# Patient Record
Sex: FEMALE | Race: WHITE | NOT HISPANIC OR LATINO | Employment: FULL TIME | RURAL
[De-identification: names, ages, dates, MRNs, and addresses within clinical notes are randomized per-mention and may not be internally consistent; named-entity substitution may affect disease eponyms.]

---

## 2021-04-07 ENCOUNTER — HISTORICAL (OUTPATIENT)
Dept: ADMINISTRATIVE | Facility: HOSPITAL | Age: 51
End: 2021-04-07

## 2021-04-07 LAB
25(OH)D3 SERPL-MCNC: 12.1 NG/ML
ALBUMIN SERPL BCP-MCNC: 3.6 G/DL (ref 3.5–5)
ALBUMIN/GLOB SERPL: 1 {RATIO}
ALP SERPL-CCNC: 102 U/L (ref 39–100)
ALT SERPL W P-5'-P-CCNC: 45 U/L (ref 13–56)
ANION GAP SERPL CALCULATED.3IONS-SCNC: 12 MMOL/L (ref 7–16)
AST SERPL W P-5'-P-CCNC: 28 U/L (ref 15–37)
BASOPHILS # BLD AUTO: 0.12 X10E3/UL (ref 0–0.2)
BASOPHILS NFR BLD AUTO: 1 % (ref 0–1)
BILIRUB SERPL-MCNC: 0.4 MG/DL (ref 0–1.2)
BUN SERPL-MCNC: 10 MG/DL (ref 7–18)
BUN/CREAT SERPL: 13
CALCIUM SERPL-MCNC: 8.7 MG/DL (ref 8.5–10.1)
CHLORIDE SERPL-SCNC: 104 MMOL/L (ref 98–107)
CHOLEST SERPL-MCNC: 330 MG/DL
CHOLEST/HDLC SERPL: 10.6 {RATIO}
CO2 SERPL-SCNC: 27 MMOL/L (ref 21–32)
CREAT SERPL-MCNC: 0.79 MG/DL (ref 0.5–1.02)
EOSINOPHIL # BLD AUTO: 0.41 X10E3/UL (ref 0–0.5)
EOSINOPHIL NFR BLD AUTO: 3.5 % (ref 1–4)
ERYTHROCYTE [DISTWIDTH] IN BLOOD BY AUTOMATED COUNT: 15.8 % (ref 11.5–14.5)
GLOBULIN SER-MCNC: 3.7 G/DL (ref 2–4)
GLUCOSE SERPL-MCNC: 100 MG/DL (ref 74–106)
HCT VFR BLD AUTO: 39.8 % (ref 38–47)
HDLC SERPL-MCNC: 31 MG/DL
HGB BLD-MCNC: 12.2 G/DL (ref 12–16)
IMM GRANULOCYTES # BLD AUTO: 0.09 X10E3/UL (ref 0–0.04)
IMM GRANULOCYTES NFR BLD: 0.8 % (ref 0–0.4)
LDLC SERPL CALC-MCNC: NORMAL MG/DL
LYMPHOCYTES # BLD AUTO: 3.41 X10E3/UL (ref 1–4.8)
LYMPHOCYTES NFR BLD AUTO: 29.1 % (ref 27–41)
MCH RBC QN AUTO: 26.3 PG (ref 27–31)
MCHC RBC AUTO-ENTMCNC: 30.7 G/DL (ref 32–36)
MCV RBC AUTO: 85.8 FL (ref 80–96)
MONOCYTES # BLD AUTO: 0.65 X10E3/UL (ref 0–0.8)
MONOCYTES NFR BLD AUTO: 5.5 % (ref 2–6)
MPC BLD CALC-MCNC: 11.1 FL (ref 9.4–12.4)
NEUTROPHILS # BLD AUTO: 7.04 X10E3/UL (ref 1.8–7.7)
NEUTROPHILS NFR BLD AUTO: 60.1 % (ref 53–65)
NRBC # BLD AUTO: 0 X10E3/UL (ref 0–0)
NRBC, AUTO (.00): 0 /100 (ref 0–0)
PLATELET # BLD AUTO: 443 X10E3/UL (ref 150–400)
POTASSIUM SERPL-SCNC: 3.2 MMOL/L (ref 3.5–5.1)
PROT SERPL-MCNC: 7.3 G/DL (ref 6.4–8.2)
RBC # BLD AUTO: 4.64 X10E6/UL (ref 4.2–5.4)
SODIUM SERPL-SCNC: 140 MMOL/L (ref 136–145)
TRIGL SERPL-MCNC: 727 MG/DL
TSH SERPL DL<=0.005 MIU/L-ACNC: 4.56 UIU/ML (ref 0.36–3.74)
WBC # BLD AUTO: 11.72 X10E3/UL (ref 4.5–11)

## 2021-04-08 LAB
EST. AVERAGE GLUCOSE BLD GHB EST-MCNC: 117 MG/DL
HBA1C MFR BLD HPLC: 6.1 %

## 2021-12-30 ENCOUNTER — OFFICE VISIT (OUTPATIENT)
Dept: FAMILY MEDICINE | Facility: CLINIC | Age: 51
End: 2021-12-30
Payer: COMMERCIAL

## 2021-12-30 VITALS
BODY MASS INDEX: 22.96 KG/M2 | HEART RATE: 65 BPM | TEMPERATURE: 97 F | WEIGHT: 137.81 LBS | HEIGHT: 65 IN | DIASTOLIC BLOOD PRESSURE: 78 MMHG | RESPIRATION RATE: 18 BRPM | OXYGEN SATURATION: 97 % | SYSTOLIC BLOOD PRESSURE: 130 MMHG

## 2021-12-30 DIAGNOSIS — R53.83 FATIGUE, UNSPECIFIED TYPE: ICD-10-CM

## 2021-12-30 DIAGNOSIS — L82.1 SEBORRHEIC KERATOSES: ICD-10-CM

## 2021-12-30 DIAGNOSIS — M19.90 OSTEOARTHRITIS, UNSPECIFIED OSTEOARTHRITIS TYPE, UNSPECIFIED SITE: ICD-10-CM

## 2021-12-30 DIAGNOSIS — I10 HYPERTENSION, UNSPECIFIED TYPE: Primary | ICD-10-CM

## 2021-12-30 PROCEDURE — 1160F PR REVIEW ALL MEDS BY PRESCRIBER/CLIN PHARMACIST DOCUMENTED: ICD-10-PCS | Mod: CPTII,,, | Performed by: NURSE PRACTITIONER

## 2021-12-30 PROCEDURE — 3008F PR BODY MASS INDEX (BMI) DOCUMENTED: ICD-10-PCS | Mod: CPTII,,, | Performed by: NURSE PRACTITIONER

## 2021-12-30 PROCEDURE — 1159F MED LIST DOCD IN RCRD: CPT | Mod: CPTII,,, | Performed by: NURSE PRACTITIONER

## 2021-12-30 PROCEDURE — 96372 THER/PROPH/DIAG INJ SC/IM: CPT | Mod: ,,, | Performed by: NURSE PRACTITIONER

## 2021-12-30 PROCEDURE — 3078F DIAST BP <80 MM HG: CPT | Mod: CPTII,,, | Performed by: NURSE PRACTITIONER

## 2021-12-30 PROCEDURE — 99203 OFFICE O/P NEW LOW 30 MIN: CPT | Mod: 25,,, | Performed by: NURSE PRACTITIONER

## 2021-12-30 PROCEDURE — 4010F ACE/ARB THERAPY RXD/TAKEN: CPT | Mod: CPTII,,, | Performed by: NURSE PRACTITIONER

## 2021-12-30 PROCEDURE — 1160F RVW MEDS BY RX/DR IN RCRD: CPT | Mod: CPTII,,, | Performed by: NURSE PRACTITIONER

## 2021-12-30 PROCEDURE — 3044F HG A1C LEVEL LT 7.0%: CPT | Mod: CPTII,,, | Performed by: NURSE PRACTITIONER

## 2021-12-30 PROCEDURE — 99203 PR OFFICE/OUTPT VISIT, NEW, LEVL III, 30-44 MIN: ICD-10-PCS | Mod: 25,,, | Performed by: NURSE PRACTITIONER

## 2021-12-30 PROCEDURE — 3075F SYST BP GE 130 - 139MM HG: CPT | Mod: CPTII,,, | Performed by: NURSE PRACTITIONER

## 2021-12-30 PROCEDURE — 1159F PR MEDICATION LIST DOCUMENTED IN MEDICAL RECORD: ICD-10-PCS | Mod: CPTII,,, | Performed by: NURSE PRACTITIONER

## 2021-12-30 PROCEDURE — 3078F PR MOST RECENT DIASTOLIC BLOOD PRESSURE < 80 MM HG: ICD-10-PCS | Mod: CPTII,,, | Performed by: NURSE PRACTITIONER

## 2021-12-30 PROCEDURE — 3044F PR MOST RECENT HEMOGLOBIN A1C LEVEL <7.0%: ICD-10-PCS | Mod: CPTII,,, | Performed by: NURSE PRACTITIONER

## 2021-12-30 PROCEDURE — 3075F PR MOST RECENT SYSTOLIC BLOOD PRESS GE 130-139MM HG: ICD-10-PCS | Mod: CPTII,,, | Performed by: NURSE PRACTITIONER

## 2021-12-30 PROCEDURE — 4010F PR ACE/ARB THEARPY RXD/TAKEN: ICD-10-PCS | Mod: CPTII,,, | Performed by: NURSE PRACTITIONER

## 2021-12-30 PROCEDURE — 96372 PR INJECTION,THERAP/PROPH/DIAG2ST, IM OR SUBCUT: ICD-10-PCS | Mod: ,,, | Performed by: NURSE PRACTITIONER

## 2021-12-30 PROCEDURE — 3008F BODY MASS INDEX DOCD: CPT | Mod: CPTII,,, | Performed by: NURSE PRACTITIONER

## 2021-12-30 RX ORDER — AMLODIPINE BESYLATE 10 MG/1
TABLET ORAL
COMMUNITY
Start: 2021-12-20

## 2021-12-30 RX ORDER — HYDROXYZINE PAMOATE 25 MG/1
25 CAPSULE ORAL 2 TIMES DAILY
COMMUNITY
Start: 2021-12-14

## 2021-12-30 RX ORDER — CHLORTHALIDONE 25 MG/1
TABLET ORAL
COMMUNITY
Start: 2021-12-20

## 2021-12-30 RX ORDER — CYANOCOBALAMIN 1000 UG/ML
1000 INJECTION, SOLUTION INTRAMUSCULAR; SUBCUTANEOUS ONCE
Qty: 1 ML | Refills: 0 | Status: SHIPPED | OUTPATIENT
Start: 2021-12-30 | End: 2021-12-30

## 2021-12-30 RX ORDER — LOSARTAN POTASSIUM 50 MG/1
50 TABLET ORAL DAILY
Qty: 30 TABLET | Refills: 0 | Status: SHIPPED | OUTPATIENT
Start: 2021-12-30 | End: 2023-06-05

## 2021-12-30 RX ORDER — LITHIUM CARBONATE 150 MG/1
CAPSULE ORAL
COMMUNITY
Start: 2021-12-29

## 2021-12-30 RX ORDER — DICLOFENAC SODIUM 10 MG/G
2 GEL TOPICAL 2 TIMES DAILY
Qty: 50 G | Refills: 0 | Status: SHIPPED | OUTPATIENT
Start: 2021-12-30 | End: 2023-06-05

## 2021-12-30 RX ORDER — ERGOCALCIFEROL 1.25 MG/1
50000 CAPSULE ORAL
COMMUNITY
Start: 2021-11-30

## 2021-12-30 RX ORDER — VENLAFAXINE HYDROCHLORIDE 150 MG/1
CAPSULE, EXTENDED RELEASE ORAL
COMMUNITY
Start: 2021-12-20

## 2021-12-30 RX ORDER — CYANOCOBALAMIN 1000 UG/ML
1000 INJECTION, SOLUTION INTRAMUSCULAR; SUBCUTANEOUS
Status: COMPLETED | OUTPATIENT
Start: 2021-12-30 | End: 2021-12-30

## 2021-12-30 RX ORDER — GABAPENTIN 800 MG/1
TABLET ORAL
COMMUNITY
Start: 2021-10-21

## 2021-12-30 RX ORDER — OMEPRAZOLE 40 MG/1
CAPSULE, DELAYED RELEASE ORAL
COMMUNITY
Start: 2021-12-20

## 2021-12-30 RX ORDER — METOPROLOL SUCCINATE 50 MG/1
TABLET, EXTENDED RELEASE ORAL
COMMUNITY
Start: 2021-12-20

## 2021-12-30 RX ADMIN — CYANOCOBALAMIN 1000 MCG: 1000 INJECTION, SOLUTION INTRAMUSCULAR; SUBCUTANEOUS at 09:12

## 2022-01-20 ENCOUNTER — TELEPHONE (OUTPATIENT)
Dept: FAMILY MEDICINE | Facility: CLINIC | Age: 52
End: 2022-01-20
Payer: COMMERCIAL

## 2022-01-20 RX ORDER — OLMESARTAN MEDOXOMIL 20 MG/1
20 TABLET ORAL DAILY
COMMUNITY

## 2022-06-10 DIAGNOSIS — M47.812 CERVICAL SPONDYLOSIS: Primary | ICD-10-CM

## 2022-06-13 ENCOUNTER — CLINICAL SUPPORT (OUTPATIENT)
Dept: REHABILITATION | Facility: HOSPITAL | Age: 52
End: 2022-06-13
Payer: COMMERCIAL

## 2022-06-13 DIAGNOSIS — M47.812 CERVICAL SPONDYLOSIS: ICD-10-CM

## 2022-06-13 PROCEDURE — 97110 THERAPEUTIC EXERCISES: CPT | Mod: PN

## 2022-06-13 PROCEDURE — 97161 PT EVAL LOW COMPLEX 20 MIN: CPT | Mod: PN

## 2022-06-13 NOTE — PLAN OF CARE
[facility]  Physical Therapy Initial Evaluation    Name: Kathleen Payne  Clinic Number: [id number]    Therapy Diagnosis:   Encounter Diagnosis   Name Primary?    Cervical spondylosis      Physician: Brianna Charlton MD    Physician Orders: PT Eval and Treat    Medical Diagnosis from Referral: Cervical Spondylosis  Evaluation Date: 2022  Authorization Period Expiration: 2022  Plan of Care Expiration: 2022  Visit # / Visits authorized: 1/ 11   10 visits approlved with rachelr through 2022    Time In: 1030  Time Out: 1130  Total Appointment Time (timed & untimed codes): 60 minutes    Precautions: Standard    Subjective   Date of onset: about a year ago, patient states she has osteoporosis on both sides of the neck.  History of current condition - see below     Medical History:   Past Medical History:   Diagnosis Date    Arthritis     Hypertension        Surgical History:   Kathleen Payne  has a past surgical history that includes  section.    Medications:   Kathleen has a current medication list which includes the following prescription(s): amlodipine, chlorthalidone, diclofenac sodium, ergocalciferol, gabapentin, hydroxyzine pamoate, lithium carbonate, losartan, metoprolol succinate, olmesartan, omeprazole, and venlafaxine.    Allergies:   Review of patient's allergies indicates:  No Known Allergies     Imaging, none:     Prior Therapy: none  Social History:   lives with their family  Occupation: works at [employer]   Prior Level of Function: independent    Current Level of Function: independent with pain    Pain:  Current 4/10, worst 10/10, best 2/10   Location: bilateral neck   Description: Aching, Grabbing, Tight, Numb and Sharp  Aggravating Factors: Standing and Morning  Easing Factors: nothing    Pts goals: Pt wants to be able to turn around in the car without trunk rotations. and to decrease pain.    Objective     Posture:   Forward head: increased  Thoracic  curve: increased  Cervical curve: increased  Laterally flexed: left   Rotated: left  Rounded shoulders: yes  Scoliosis: no  Shoulder height: left increased  Clavical height: left increased  Comments:    Cervical AROM:    SBL: 42  SBR: 35  RL:  50  RR: 60    MMT Right  Left    C1-C2 Chin up MMT strength: 3+/5 MMT strength: 3+/5   C1-C2 Chin in MMT shoulder: 3+/5 MMT strength: 3+/5   C3 Lateral flexion MMT strength: 4/5 MMT strength: 3+/5   C4 Shoulder Shrug MMT strength: 4/5 MMT strength: 4/5   C5 Biceps MMT strength: 4/5 MMT strength: 4/5   C6 Wrist extension MMT strength: 4/5 MMT strength: 4/5   C7 Triceps MMT strength: 4/5 MMT strength: 4/5      Shoulder AROM Right  Left    Flexion (180) 175 175   Internal rotation (45)       External rotation (45) 45 45   Abduction (180) 175 175                    Segment/Mobility:     Special test:    Compression test Negative   Thoracic outlet  Negative   Distraction Negative                 Palpation Body side Positive/negative Increased tone   Sternocleidomastoid bilateral Positive yes   Scalenes bilateral Positive yes   1st rib bilateral Negative no   Upper traps left Positive yes   Suboccipitals bilateral Positive yes   Transverse process bilateral Positive no   Spinous process bilateral Negative no   Pec Minor bilateral Positive yes   Masseter bilateral Positive yes   Mastoid process bilateral Positive yes         Comments        Other tests/information:    Limitation/Restriction for FOTO cervical  Survey    Therapist reviewed FOTO scores for Kathleen Payne on 6/13/2022.   FOTO documents entered into [employer] - see Media section.    Limitation Score: 46%         TREATMENT       Kathleen received the treatments listed below:  THERAPEUTIC EXERCISES to develop strength, endurance, ROM, flexibility and posture for 20 minutes including home ex program     Home Exercises and Patient Education Provided    Education provided:   - home ex program     Written Home Exercises  Provided: yes.  Exercises were reviewed and Kathleen was able to demonstrate them prior to the end of the session.  Kathleen demonstrated good  understanding of the education provided.     See EMR under Media for exercises provided 6/13/2022.    Assessment   Kathleen is a 52 y.o. female referred to outpatient Physical Therapy with a medical diagnosis of cervical spondylosis . Pt presents with cervical spondylosis. Pt has decreased cervical range of motion, decreased cervical strength, and pain with cervical rotation and lateral cervical bending.     Pt prognosis is Excellent.   Pt will benefit from skilled outpatient Physical Therapy to address the deficits stated above and in the chart below, provide pt/family education, and to maximize pt's level of independence.     Plan of care discussed with patient: Yes  Pt's spiritual, cultural and educational needs considered and patient is agreeable to the plan of care and goals as stated below:     Anticipated Barriers for therapy: Pt has decreased cervical range of motion, decreased cervical strength, and pain with cervical rotation and lateral cervical bending.         Goals:  Short Term Goals: 4 weeks   Pt will be independent with home exercise program  Pt will increase cervical rotation to 65 degrees  Pt will increase cervical lateral side bending to 45 degrees  Pt will decrease pain to 4/10 to improve quality of life  Pt will increase strength to 4/5     Long Term Goals: 6 weeks   Pt will increase cervical rotation to 75 degrees  Pt will increase cervical lateral side bending to 55 degrees  Pt will decrease pain to 2/10 to improve quality of life  Pt will increase strength to 5/5    Plan   Plan of care Certification: 6/13/2022 to 7/12/2022.    Outpatient Physical Therapy 2 times weekly for 5 weeks to include the following interventions: Patient Education. There ex, modalities as needed.     Plan of care has been reestablished with Yolis WALTERS and Nora Cortes  SELENA.     Rodolfo Hernandez, PT           I CERTIFY THE NEED FOR THESE SERVICES FURNISHED UNDER THIS PLAN OF TREATMENT AND WHILE UNDER MY CARE.    Physician's comments:      Physician's Signature: ___________________________________________________

## 2022-06-15 ENCOUNTER — CLINICAL SUPPORT (OUTPATIENT)
Dept: REHABILITATION | Facility: HOSPITAL | Age: 52
End: 2022-06-15
Payer: COMMERCIAL

## 2022-06-15 DIAGNOSIS — M47.812 CERVICAL SPONDYLOSIS: ICD-10-CM

## 2022-06-15 PROCEDURE — 97035 APP MDLTY 1+ULTRASOUND EA 15: CPT | Mod: PN

## 2022-06-15 PROCEDURE — 97110 THERAPEUTIC EXERCISES: CPT | Mod: PN

## 2022-06-15 NOTE — PROGRESS NOTES
Physical Therapy Treatment Note     Name: Kathleen WILKERSON [facility] Number: [id number]    Therapy Diagnosis: No diagnosis found.  Physician: Brianna Charlton MD    Visit Date: 6/15/2022    Physician Orders: PT Eval and Treat    Medical Diagnosis from Referral: Cervical Spondylosis  Evaluation Date: 6/13/2022  Authorization Period Expiration: 7/12/2022  Plan of Care Expiration: 7/12/2022  Visit # / Visits authorized: 2/ 11   10 visits approlved with ambetter through 8/14/2022     Time In: 1055  Time Out: 1140  Total Billable Time: 45 minutes    Precautions: Standard       Subjective     Pt reports: the last visit helped her neck feel better..  She was compliant with home exercise program.       Pain: 5/10  Location: bilateral neck      Objective     Kathleen received therapeutic exercises to develop strength, endurance, ROM, flexibility, posture and core stabilization for 30 minutes including:    ube x 5 min   Scapular retraction x 20 reps with blue t band  Corner stretch x 5 reps with 10 sec hold  Seated cervical rotation x 10 reps with manual stretch at end range  Seated lateral tilt x 10 reps with manual stretch at end range   Prone scapular retraction x 10 reps     Myofascial release x 5 min   Ultrasound to bilateral upper traps x 8 min at 1 mhz at 1.8 reich/cm2      SBL 40  SBR 39  RR 65  RL 55      Home Exercises Provided and Patient Education Provided     Education provided: home ex program    Written Home Exercises Provided: yes.  Exercises were reviewed and Kathleen was able to demonstrate them prior to the end of the session.  Kathleen demonstrated good  understanding of the education provided.     See EMR under Media for exercises provided prior visit.    Assessment     Pt improving with range of motion since last visit   Kathleen Is progressing well towards her goals.   Pt prognosis is Excellent.     Pt will continue to benefit from skilled outpatient physical therapy to address the deficits listed in the  problem list box on initial evaluation, provide pt/family education and to maximize pt's level of independence in the home and community environment.     Pt's spiritual, cultural and educational needs considered and pt agreeable to plan of care and goals.     Anticipated Barriers for therapy: Pt has decreased cervical range of motion, decreased cervical strength, and pain with cervical rotation and lateral cervical bending.            Goals:  Short Term Goals: 4 weeks   Pt will be independent with home exercise program  Pt will increase cervical rotation to 65 degrees  Pt will increase cervical lateral side bending to 45 degrees  Pt will decrease pain to 4/10 to improve quality of life  Pt will increase strength to 4/5      Long Term Goals: 6 weeks   Pt will increase cervical rotation to 75 degrees  Pt will increase cervical lateral side bending to 55 degrees  Pt will decrease pain to 2/10 to improve quality of life  Pt will increase strength to 5/5     Plan   Plan of care Certification: 6/13/2022 to 7/12/2022.     Outpatient Physical Therapy 2 times weekly for 5 weeks to include the following interventions: Patient Education. There ex, modalities as needed.      Plan of care has been reestablished with Yolis WALTERS and Nora WALTERS.       Rodolfo Hernandez, PT  6/15/2022

## 2022-06-21 ENCOUNTER — CLINICAL SUPPORT (OUTPATIENT)
Dept: REHABILITATION | Facility: HOSPITAL | Age: 52
End: 2022-06-21
Payer: COMMERCIAL

## 2022-06-21 DIAGNOSIS — M47.812 CERVICAL SPONDYLOSIS: Primary | ICD-10-CM

## 2022-06-21 PROCEDURE — 97110 THERAPEUTIC EXERCISES: CPT | Mod: PN

## 2022-06-21 PROCEDURE — 97035 APP MDLTY 1+ULTRASOUND EA 15: CPT | Mod: PN

## 2022-06-21 NOTE — PROGRESS NOTES
Physical Therapy Treatment Note     Name: Kathleen WILKERSON [facility] Number: [id number]    Therapy Diagnosis:   Encounter Diagnosis   Name Primary?    Cervical spondylosis Yes     Physician: Brianna Charlton MD    Visit Date: 6/21/2022    Physician Orders: PT Eval and Treat    Medical Diagnosis from Referral: Cervical Spondylosis  Evaluation Date: 6/13/2022  Authorization Period Expiration: 7/12/2022  Plan of Care Expiration: 7/12/2022  Visit # / Visits authorized: 3/ 11   10 visits approlved with ambetter through 8/14/2022     Time In: 1055  Time Out: 1140  Total Billable Time: 45 minutes    Precautions: Standard       Subjective     Pt reports: the last visit helped her neck feel better..  She was compliant with home exercise program.       Pain: 5/10  Location: bilateral neck      Objective     Kathleen received therapeutic exercises to develop strength, endurance, ROM, flexibility, posture and core stabilization for 30 minutes including:    ube x 5 min   Scapular retraction x 20 reps with blue t band  Corner stretch x 5 reps with 10 sec hold  Seated cervical rotation x 10 reps with manual stretch at end range  Seated lateral tilt x 10 reps with manual stretch at end range   Prone scapular retraction x 10 reps   Sidelying with lateral head tilt stretch    Myofascial release x 5 min   Ultrasound to bilateral upper traps x 8 min at 1 mhz at 1.8 reich/cm2      SBL 45  SBR 42  RR 65  RL 65      Home Exercises Provided and Patient Education Provided     Education provided: home ex program    Written Home Exercises Provided: yes.  Exercises were reviewed and Kathleen was able to demonstrate them prior to the end of the session.  Kathleen demonstrated good  understanding of the education provided.     See EMR under Media for exercises provided prior visit.    Assessment     Pt improving with range of motion since last visit   Kathleen Is progressing well towards her goals.   Pt prognosis is Excellent.     Pt will continue to  benefit from skilled outpatient physical therapy to address the deficits listed in the problem list box on initial evaluation, provide pt/family education and to maximize pt's level of independence in the home and community environment.     Pt's spiritual, cultural and educational needs considered and pt agreeable to plan of care and goals.     Anticipated Barriers for therapy: Pt has decreased cervical range of motion, decreased cervical strength, and pain with cervical rotation and lateral cervical bending.            Goals:  Short Term Goals: 4 weeks   Pt will be independent with home exercise program  Pt will increase cervical rotation to 65 degrees  Pt will increase cervical lateral side bending to 45 degrees  Pt will decrease pain to 4/10 to improve quality of life  Pt will increase strength to 4/5      Long Term Goals: 6 weeks   Pt will increase cervical rotation to 75 degrees  Pt will increase cervical lateral side bending to 55 degrees  Pt will decrease pain to 2/10 to improve quality of life  Pt will increase strength to 5/5     Plan   Plan of care Certification: 6/13/2022 to 7/12/2022.     Outpatient Physical Therapy 2 times weekly for 5 weeks to include the following interventions: Patient Education. There ex, modalities as needed.      Plan of care has been reestablished with Yolis WALTERS and Nora WALTERS.       Rodolfo Hernandez, PT  6/21/2022

## 2022-06-23 ENCOUNTER — CLINICAL SUPPORT (OUTPATIENT)
Dept: REHABILITATION | Facility: HOSPITAL | Age: 52
End: 2022-06-23
Payer: COMMERCIAL

## 2022-06-23 DIAGNOSIS — M47.812 CERVICAL SPONDYLOSIS: Primary | ICD-10-CM

## 2022-06-23 PROCEDURE — 97110 THERAPEUTIC EXERCISES: CPT | Mod: PN

## 2022-06-23 PROCEDURE — 97035 APP MDLTY 1+ULTRASOUND EA 15: CPT | Mod: PN

## 2022-06-23 NOTE — PROGRESS NOTES
Physical Therapy Treatment Note     Name: Kathleen WILKERSON [facility] Number: [id number]    Therapy Diagnosis:   Encounter Diagnosis   Name Primary?    Cervical spondylosis Yes     Physician: Brianna Charlton MD    Visit Date: 6/23/2022    Physician Orders: PT Eval and Treat    Medical Diagnosis from Referral: Cervical Spondylosis  Evaluation Date: 6/13/2022  Authorization Period Expiration: 7/12/2022  Plan of Care Expiration: 7/12/2022  Visit # / Visits authorized: 3/ 11   10 visits approlved with ambetter through 8/14/2022     Time In: 1055  Time Out: 1140  Total Billable Time: 45 minutes    Precautions: Standard       Subjective     Pt reports: the last visit helped her neck feel better..  She was compliant with home exercise program.       Pain: 5/10  Location: bilateral neck      Objective     Kathleen received therapeutic exercises to develop strength, endurance, ROM, flexibility, posture and core stabilization for 30 minutes including:    ube x 5 min   Pulleys x 3 min  Scapular retraction x 20 reps with blue t band  Corner stretch x 5 reps with 10 sec hold  Seated cervical rotation x 10 reps with manual stretch at end range  Seated lateral tilt x 10 reps with manual stretch at end range   Prone scapular retraction x 10 reps   Sidelying with lateral head tilt stretch    Myofascial release x 5 min   Ultrasound to bilateral upper traps x 8 min at 1 mhz at 1.8 reich/cm2      SBL 46  SBR 45  RR 65  RL 65      Home Exercises Provided and Patient Education Provided     Education provided: home ex program    Written Home Exercises Provided: yes.  Exercises were reviewed and Kathleen was able to demonstrate them prior to the end of the session.  Kathleen demonstrated good  understanding of the education provided.     See EMR under Media for exercises provided prior visit.    Assessment     Pt improving with range of motion since last visit   Kathleen Is progressing well towards her goals.   Pt prognosis is Excellent.     Pt  will continue to benefit from skilled outpatient physical therapy to address the deficits listed in the problem list box on initial evaluation, provide pt/family education and to maximize pt's level of independence in the home and community environment.     Pt's spiritual, cultural and educational needs considered and pt agreeable to plan of care and goals.     Anticipated Barriers for therapy: Pt has decreased cervical range of motion, decreased cervical strength, and pain with cervical rotation and lateral cervical bending.            Goals:  Short Term Goals: 4 weeks   Pt will be independent with home exercise program  Pt will increase cervical rotation to 65 degrees  Pt will increase cervical lateral side bending to 45 degrees  Pt will decrease pain to 4/10 to improve quality of life  Pt will increase strength to 4/5      Long Term Goals: 6 weeks   Pt will increase cervical rotation to 75 degrees  Pt will increase cervical lateral side bending to 55 degrees  Pt will decrease pain to 2/10 to improve quality of life  Pt will increase strength to 5/5     Plan   Plan of care Certification: 6/13/2022 to 7/12/2022.     Outpatient Physical Therapy 2 times weekly for 5 weeks to include the following interventions: Patient Education. There ex, modalities as needed.      Plan of care has been reestablished with Yolis WALTERS and Nora WALTERS.       Rodolfo Hernandez, PT  6/23/2022

## 2022-06-28 ENCOUNTER — CLINICAL SUPPORT (OUTPATIENT)
Dept: REHABILITATION | Facility: HOSPITAL | Age: 52
End: 2022-06-28
Payer: COMMERCIAL

## 2022-06-28 DIAGNOSIS — M47.812 CERVICAL SPONDYLOSIS: Primary | ICD-10-CM

## 2022-06-28 DIAGNOSIS — M53.82 DECREASED RANGE OF MOTION OF INTERVERTEBRAL DISCS OF CERVICAL SPINE: ICD-10-CM

## 2022-06-28 PROCEDURE — 97110 THERAPEUTIC EXERCISES: CPT | Mod: PN,CQ

## 2022-06-28 PROCEDURE — 97035 APP MDLTY 1+ULTRASOUND EA 15: CPT | Mod: PN,CQ

## 2022-06-28 NOTE — PROGRESS NOTES
Physical Therapy Treatment Note     Name: Kathleen WILKERSON [facility] Number: [id number]    Therapy Diagnosis:   Encounter Diagnoses   Name Primary?    Cervical spondylosis Yes    Decreased range of motion of intervertebral discs of cervical spine      Physician: Brianna Charlton MD    Visit Date: 6/28/2022    Physician Orders: PT Eval and Treat    Medical Diagnosis from Referral: Cervical Spondylosis  Evaluation Date: 6/13/2022  Authorization Period Expiration: 7/12/2022  Plan of Care Expiration: 7/12/2022  Visit # / Visits authorized: 4/ 11   10 visits approlved with jamietter through 8/14/2022   PTA VISIT# 1    Time In: 1056  Time Out: 1137  Total Billable Time: 41 minutes    Precautions: Standard   Plan of care reviewed with Rodolfo Hernandez PT.     Subjective     Pt reports: She has been going to different doctor appointments and busy so she's done less stretching.  She was compliant with home exercise program.     Pain: 5/10  Location: bilateral neck      Objective     Kathleen received therapeutic exercises to develop strength, endurance, ROM, flexibility, posture and core stabilization for 30 minutes including:    ube x 5 min   Pulleys x 3 min  Scapular retraction x 20 reps with blue t band  Corner stretch x 5 reps with 10 sec hold  Seated cervical rotation x 15 reps with manual stretch at end range  Seated lateral tilt x 15 reps with manual stretch at end range   Prone scapular retraction x 15 reps   Sidelying with lateral head tilt stretch   Sidelying bilateral head raises x 10 repetitions     Myofascial release x 5 min   Ultrasound to bilateral upper traps x 8 min at 1 mhz at 1.8 reich/cm2      SBL 50  SBR 45  RR 69  RL 65    Home Exercises Provided and Patient Education Provided     Education provided: home ex program    Written Home Exercises Provided: yes.  Exercises were reviewed and Kathleen was able to demonstrate them prior to the end of the session.  Kathleen demonstrated good  understanding of the  education provided.     See EMR under Media for exercises provided prior visit.    Assessment     Pt improving with range of motion in right cervical rotation and left side bending. Patient reported 2/10 pain after treatment.  Kathleen Is progressing well towards her goals.   Pt prognosis is Excellent.     Pt will continue to benefit from skilled outpatient physical therapy to address the deficits listed in the problem list box on initial evaluation, provide pt/family education and to maximize pt's level of independence in the home and community environment.     Pt's spiritual, cultural and educational needs considered and pt agreeable to plan of care and goals.     Anticipated Barriers for therapy: Pt has decreased cervical range of motion, decreased cervical strength, and pain with cervical rotation and lateral cervical bending.      Goals:  Short Term Goals: 4 weeks   Pt will be independent with home exercise program  Pt will increase cervical rotation to 65 degrees  Pt will increase cervical lateral side bending to 45 degrees  Pt will decrease pain to 4/10 to improve quality of life  Pt will increase strength to 4/5      Long Term Goals: 6 weeks   Pt will increase cervical rotation to 75 degrees  Pt will increase cervical lateral side bending to 55 degrees  Pt will decrease pain to 2/10 to improve quality of life  Pt will increase strength to 5/5     Plan   Plan of care Certification: 6/13/2022 to 7/12/2022.     Outpatient Physical Therapy 2 times weekly for 5 weeks to include the following interventions: Patient Education. There ex, modalities as needed.      Plan of care has been reestablished with Yolis WALTERS and Nora WALTERS.       Kendy Knott, PTA  6/28/2022

## 2022-06-30 ENCOUNTER — CLINICAL SUPPORT (OUTPATIENT)
Dept: REHABILITATION | Facility: HOSPITAL | Age: 52
End: 2022-06-30
Payer: COMMERCIAL

## 2022-06-30 DIAGNOSIS — M47.812 CERVICAL SPONDYLOSIS: Primary | ICD-10-CM

## 2022-06-30 DIAGNOSIS — M53.82 DECREASED RANGE OF MOTION OF INTERVERTEBRAL DISCS OF CERVICAL SPINE: ICD-10-CM

## 2022-06-30 PROCEDURE — 97110 THERAPEUTIC EXERCISES: CPT | Mod: PN,CQ

## 2022-06-30 PROCEDURE — 97035 APP MDLTY 1+ULTRASOUND EA 15: CPT | Mod: PN,CQ

## 2022-06-30 NOTE — PROGRESS NOTES
Physical Therapy Treatment Note     Name: Kathleen WILKERSON [facility] Number: [id number]    Therapy Diagnosis:   Encounter Diagnoses   Name Primary?    Cervical spondylosis Yes    Decreased range of motion of intervertebral discs of cervical spine      Physician: Brianna Charlton MD    Visit Date: 6/30/2022    Physician Orders: PT Eval and Treat    Medical Diagnosis from Referral: Cervical Spondylosis  Evaluation Date: 6/13/2022  Authorization Period Expiration: 7/12/2022  Plan of Care Expiration: 7/12/2022  Visit # / Visits authorized: 5/ 11   10 visits approlved with rachelr through 8/14/2022   PTA VISIT# 2    Time In: 1050  Time Out: 1128  Total Billable Time:38 minutes    Precautions: Standard   Plan of care reviewed with Rodolfo Hernandez PT.     Subjective     Pt reports: she didn't get her hot shower before PT tx. Today to help her loosen up, states she will do that after tx.   She was compliant with home exercise program.     Pain: 5/10  Location: bilateral neck      Objective     Kathleen received therapeutic exercises to develop strength, endurance, ROM, flexibility, posture and core stabilization for 30 minutes including:    ube x 5 min   Pulleys x 3 min  Scapular retraction x 20 reps with blue t band  Corner stretch x 5 reps with 10 sec hold  Seated cervical rotation x 15 reps with manual stretch at end range  Seated lateral tilt x 15 reps with manual stretch at end range   Prone scapular retraction x 15 reps   Sidelying with lateral head tilt stretch   Sidelying bilateral head raises x 10 repetitions     Myofascial release x 5 min   Ultrasound to bilateral upper traps x 8 min at 1 mhz at 1.8 reich/cm2      SBL 41  SBR 45  RR 50  RL 45    Home Exercises Provided and Patient Education Provided     Education provided: home ex program    Written Home Exercises Provided: yes.  Exercises were reviewed and Kathleen was able to demonstrate them prior to the end of the session.  Kathleen demonstrated good   understanding of the education provided.     See EMR under Media for exercises provided prior visit.    Assessment     Pt improving with range of motion in right cervical rotation and left side bending. Patient reported 2/10 pain after treatment.  Kathleen Is progressing well towards her goals.   Pt prognosis is Excellent.     Pt will continue to benefit from skilled outpatient physical therapy to address the deficits listed in the problem list box on initial evaluation, provide pt/family education and to maximize pt's level of independence in the home and community environment.     Pt's spiritual, cultural and educational needs considered and pt agreeable to plan of care and goals.     Anticipated Barriers for therapy: Pt has decreased cervical range of motion, decreased cervical strength, and pain with cervical rotation and lateral cervical bending.      Goals:  Short Term Goals: 4 weeks   Pt will be independent with home exercise program  Pt will increase cervical rotation to 65 degrees  Pt will increase cervical lateral side bending to 45 degrees  Pt will decrease pain to 4/10 to improve quality of life  Pt will increase strength to 4/5      Long Term Goals: 6 weeks   Pt will increase cervical rotation to 75 degrees  Pt will increase cervical lateral side bending to 55 degrees  Pt will decrease pain to 2/10 to improve quality of life  Pt will increase strength to 5/5     Plan   Plan of care Certification: 6/13/2022 to 7/12/2022.     Outpatient Physical Therapy 2 times weekly for 5 weeks to include the following interventions: Patient Education. There ex, modalities as needed.      Plan of care has been reestablished with Yolis WALTERS and Nora WALTERS.       Anita Dawn, PTA  6/30/2022

## 2022-07-26 NOTE — PLAN OF CARE
Physical Therapy Treatment Note      Name: Kathleen WILKERSON [facility] Number: [id number]     Therapy Diagnosis:        Encounter Diagnoses   Name Primary?    Cervical spondylosis Yes    Decreased range of motion of intervertebral discs of cervical spine        Physician: Brianna Charlton MD     Visit Date: 6/30/2022     Physician Orders: PT Eval and Treat    Medical Diagnosis from Referral: Cervical Spondylosis  Evaluation Date: 6/13/2022  Authorization Period Expiration: 7/12/2022  Plan of Care Expiration: 7/12/2022  Visit # / Visits authorized: 5/ 11   10 visits approlved with rachelr through 8/14/2022   PTA VISIT# 2     Time In: 1050  Time Out: 1128  Total Billable Time:38 minutes     Precautions: Standard   Plan of care reviewed with Rodolfo Hernandez PT.      Subjective      Pt reports: she didn't get her hot shower before PT tx. Today to help her loosen up, states she will do that after tx.   She was compliant with home exercise program.     Pain: 5/10  Location: bilateral neck       Objective      Kathleen received therapeutic exercises to develop strength, endurance, ROM, flexibility, posture and core stabilization for 30 minutes including:     ube x 5 min   Pulleys x 3 min  Scapular retraction x 20 reps with blue t band  Corner stretch x 5 reps with 10 sec hold  Seated cervical rotation x 15 reps with manual stretch at end range  Seated lateral tilt x 15 reps with manual stretch at end range   Prone scapular retraction x 15 reps   Sidelying with lateral head tilt stretch   Sidelying bilateral head raises x 10 repetitions      Myofascial release x 5 min   Ultrasound to bilateral upper traps x 8 min at 1 mhz at 1.8 reich/cm2       SBL 41  SBR 45  RR 50  RL 45     Home Exercises Provided and Patient Education Provided      Education provided: home ex program     Written Home Exercises Provided: yes.  Exercises were reviewed and Kathleen was able to demonstrate them prior to the end of the session.  Kathleen  demonstrated good  understanding of the education provided.      See EMR under Media for exercises provided prior visit.     Assessment      Pt improving with range of motion in right cervical rotation and left side bending. Patient reported 2/10 pain after treatment.  Kathleen Is progressing well towards her goals.   Pt prognosis is Excellent.      Pt will continue to benefit from skilled outpatient physical therapy to address the deficits listed in the problem list box on initial evaluation, provide pt/family education and to maximize pt's level of independence in the home and community environment.      Pt's spiritual, cultural and educational needs considered and pt agreeable to plan of care and goals.     Anticipated Barriers for therapy: Pt has decreased cervical range of motion, decreased cervical strength, and pain with cervical rotation and lateral cervical bending.      Goals:  Short Term Goals: 4 weeks   Pt will be independent with home exercise program  Pt will increase cervical rotation to 65 degrees  Pt will increase cervical lateral side bending to 45 degrees  Pt will decrease pain to 4/10 to improve quality of life  Pt will increase strength to 4/5      Long Term Goals: 6 weeks   Pt will increase cervical rotation to 75 degrees  Pt will increase cervical lateral side bending to 55 degrees  Pt will decrease pain to 2/10 to improve quality of life  Pt will increase strength to 5/5     Plan       Outpatient Therapy Discharge Summary     Name: Kathleen WILKERSON [facility] Number: [id number]    Therapy Diagnosis:   Encounter Diagnoses   Name Primary?    Cervical spondylosis Yes    Decreased range of motion of intervertebral discs of cervical spine      Physician: Brianna Charlton MD           Assessment    Goals:  Pt was progressing with goals and improving     Discharge reason: Patient has not attended therapy since 6/30/2022    Plan   This patient is discharged from Physical Therapy.  Rodolfo Hernandez, PT

## 2023-05-05 ENCOUNTER — TELEPHONE (OUTPATIENT)
Dept: ADMINISTRATIVE | Facility: HOSPITAL | Age: 53
End: 2023-05-05

## 2023-05-05 NOTE — TELEPHONE ENCOUNTER
NO ANSWER, LVM to return call to verify [facility] / [phone]  Former pt of: OMAR Altamirano

## 2023-06-03 ENCOUNTER — HOSPITAL ENCOUNTER (EMERGENCY)
Facility: HOSPITAL | Age: 53
Discharge: HOME | End: 2023-06-03
Payer: COMMERCIAL

## 2023-06-03 VITALS
DIASTOLIC BLOOD PRESSURE: 46 MMHG | BODY MASS INDEX: 20.89 KG/M2 | TEMPERATURE: 99 F | HEIGHT: 66 IN | HEART RATE: 65 BPM | SYSTOLIC BLOOD PRESSURE: 135 MMHG | RESPIRATION RATE: 16 BRPM | OXYGEN SATURATION: 98 % | WEIGHT: 130 LBS

## 2023-06-03 DIAGNOSIS — K08.89 PAIN, DENTAL: Primary | ICD-10-CM

## 2023-06-03 PROCEDURE — 99284 EMERGENCY DEPT VISIT MOD MDM: CPT | Mod: ,,, | Performed by: NURSE PRACTITIONER

## 2023-06-03 PROCEDURE — 99284 EMERGENCY DEPT VISIT MOD MDM: CPT

## 2023-06-03 PROCEDURE — 99284 PR EMERGENCY DEPT VISIT,LEVEL IV: ICD-10-PCS | Mod: ,,, | Performed by: NURSE PRACTITIONER

## 2023-06-03 RX ORDER — HYDROCODONE BITARTRATE AND ACETAMINOPHEN 5; 325 MG/1; MG/1
1 TABLET ORAL EVERY 12 HOURS PRN
Qty: 4 TABLET | Refills: 0 | Status: SHIPPED | OUTPATIENT
Start: 2023-06-03 | End: 2023-06-05

## 2023-06-03 RX ORDER — CEPHALEXIN 500 MG/1
500 CAPSULE ORAL EVERY 6 HOURS
Qty: 20 CAPSULE | Refills: 0 | Status: SHIPPED | OUTPATIENT
Start: 2023-06-03 | End: 2023-06-08

## 2023-06-03 NOTE — ED PROVIDER NOTES
Encounter Date: 6/3/2023       History     Chief Complaint   Patient presents with    Dental Pain     52 y/o white female presents today with c/o dental pain and swelling on left lower.  She is requesting something for pain.  Symptoms started Thursday night after getting off work at [employer] at 11 pm.  Her dentist, SEAN Cortes is not open on Friday.  She was unable to sleep due to pain.  Nothing is helping.  She cannot eat/drink.  She denies fever, chills, n/v/d, chest pain, or shortness of breath. Patient states she cannot take Penicillin due to severe GI side effects and is requesting Keflex.      Dental Pain  The primary symptoms include mouth pain. Primary symptoms do not include dental injury, oral bleeding, oral lesions, headaches, fever, shortness of breath, sore throat, angioedema or cough. The symptoms began several days ago. The symptoms are worsening. The symptoms are new. The symptoms occur constantly.   Mouth pain began 24 -48 hours ago. Mouth pain occurs constantly. Mouth pain is worsening. Affected locations include: teeth. At its highest the mouth pain was at 10/10. The mouth pain is currently at 10/10.   Additional symptoms include: dental sensitivity to temperature, gum swelling, gum tenderness, jaw pain and facial swelling (left lower). Additional symptoms do not include: purulent gums, trismus, trouble swallowing, pain with swallowing, excessive salivation, dry mouth, taste disturbance, smell disturbance, drooling (let lower), ear pain, hearing loss, nosebleeds, swollen glands, goiter and fatigue.   Review of patient's allergies indicates:  No Known Allergies  Past Medical History:   Diagnosis Date    Arthritis     Hypertension      Past Surgical History:   Procedure Laterality Date     SECTION       Family History   Problem Relation Age of Onset    Hypertension Mother     Alzheimer's disease Father      Social History     Tobacco Use    Smoking status: Every Day     Packs/day: 1.50      "Types: Cigarettes    Smokeless tobacco: Never   Substance Use Topics    Alcohol use: Not Currently    Drug use: Yes     Types: Marijuana     Review of Systems   Constitutional:  Negative for chills, fatigue and fever.   HENT:  Positive for dental problem, facial swelling (left lower) and rhinorrhea ("from crying"). Negative for drooling (let lower), ear pain, hearing loss, nosebleeds, sore throat and trouble swallowing.    Eyes: Negative.    Respiratory:  Negative for apnea, cough, choking, chest tightness, shortness of breath, wheezing and stridor.    Cardiovascular:  Negative for chest pain.   Gastrointestinal: Negative.  Negative for nausea and vomiting.   Skin: Negative.    Neurological:  Negative for weakness, numbness and headaches.     Physical Exam     Initial Vitals [06/03/23 0849]   BP Pulse Resp Temp SpO2   (!) 135/46 65 16 98.6 °F (37 °C) 98 %      MAP       --         Physical Exam    Constitutional: She appears well-developed and well-nourished.   HENT:   Submandibular lymphadenopathy, left lower gum swelling and missing teeth and caries, no purulent drainge   Neck: Neck supple. No tracheal deviation present.   Normal range of motion.  Cardiovascular:  Normal rate, regular rhythm and normal heart sounds.           Pulmonary/Chest: Breath sounds normal. No stridor.   Abdominal: Abdomen is soft. Bowel sounds are normal. She exhibits no distension. There is no abdominal tenderness.   Musculoskeletal:         General: Normal range of motion.      Cervical back: Normal range of motion and neck supple.     Neurological: She is alert and oriented to person, place, and time. She has normal strength.   Skin: Skin is warm and dry. Capillary refill takes less than 2 seconds.   Psychiatric: She has a normal mood and affect.       Medical Screening Exam   See Full Note    ED Course   Procedures  Labs Reviewed - No data to display       Imaging Results    None          Medications - No data to display  Medical " Decision Makin y/o white female presents today with c/o dental pain and swelling on left lower.  She is requesting something for pain.  Symptoms started Thursday night after getting off work at [employer] at 11 pm.  Her dentist, SEAN Cortes is not open on Friday.  She was unable to sleep due to pain.  Nothing is helping.  She cannot eat/drink.  She denies fever, chills, n/v/d, chest pain, or shortness of breath. Patient states she cannot take Penicillin due to severe GI side effects and is requesting Keflex.    MDM    Patient presents for emergent evaluation of acute dental pain that poses a threat to life and/or bodily function.    In the ED patient found to have acute dental pain.          Discharge MDM  Prescribed Keflex and Norco.   was check.  Last prescription for narcotics was on 23 a 2 day supply.  Patient was discharged in stable condition.  Detailed return precautions discussed.                          Clinical Impression:   Final diagnoses:  [K08.89] Pain, dental (Primary)        ED Disposition Condition    Discharge Stable          ED Prescriptions       Medication Sig Dispense Start Date End Date Auth. Provider    cephALEXin (KEFLEX) 500 MG capsule Take 1 capsule (500 mg total) by mouth every 6 (six) hours. for 5 days 20 capsule 6/3/2023 2023 OMAR Saul    HYDROcodone-acetaminophen (NORCO) 5-325 mg per tablet Take 1 tablet by mouth every 12 (twelve) hours as needed for Pain. 4 tablet 6/3/2023 -- OMAR Saul          Follow-up Information       Follow up With Specialties Details Why Contact Info    Joyce Cortes  On 2023               OMAR Saul  23 [phone]

## 2023-06-03 NOTE — ED TRIAGE NOTES
Pt reports dental pain on a left lower tooth. She reports it started Thursday while she was at work.

## 2023-06-03 NOTE — DISCHARGE INSTRUCTIONS
May use orajel  Taken antibiotics until gone even if feeling better.  Pain medication only to be taken as needed for severe pain.    Follow-up with dentist on Monday   Return to ER for new or worsening of symptoms.

## 2023-06-05 ENCOUNTER — OFFICE VISIT (OUTPATIENT)
Dept: FAMILY MEDICINE | Facility: CLINIC | Age: 53
End: 2023-06-05
Payer: COMMERCIAL

## 2023-06-05 VITALS
WEIGHT: 124.81 LBS | BODY MASS INDEX: 20.06 KG/M2 | HEART RATE: 68 BPM | HEIGHT: 66 IN | OXYGEN SATURATION: 99 % | DIASTOLIC BLOOD PRESSURE: 81 MMHG | SYSTOLIC BLOOD PRESSURE: 173 MMHG | TEMPERATURE: 99 F

## 2023-06-05 DIAGNOSIS — R59.1 LYMPHADENOPATHY: ICD-10-CM

## 2023-06-05 DIAGNOSIS — L03.211 CELLULITIS OF FACE: ICD-10-CM

## 2023-06-05 DIAGNOSIS — K04.7 ABSCESSED TOOTH: ICD-10-CM

## 2023-06-05 DIAGNOSIS — K08.89 PAIN, DENTAL: Primary | ICD-10-CM

## 2023-06-05 PROCEDURE — 99213 PR OFFICE/OUTPT VISIT, EST, LEVL III, 20-29 MIN: ICD-10-PCS | Mod: 25,,, | Performed by: NURSE PRACTITIONER

## 2023-06-05 PROCEDURE — 96372 PR INJECTION,THERAP/PROPH/DIAG2ST, IM OR SUBCUT: ICD-10-PCS | Mod: ,,, | Performed by: NURSE PRACTITIONER

## 2023-06-05 PROCEDURE — 4010F PR ACE/ARB THEARPY RXD/TAKEN: ICD-10-PCS | Mod: CPTII,,, | Performed by: NURSE PRACTITIONER

## 2023-06-05 PROCEDURE — 3077F SYST BP >= 140 MM HG: CPT | Mod: CPTII,,, | Performed by: NURSE PRACTITIONER

## 2023-06-05 PROCEDURE — 3079F PR MOST RECENT DIASTOLIC BLOOD PRESSURE 80-89 MM HG: ICD-10-PCS | Mod: CPTII,,, | Performed by: NURSE PRACTITIONER

## 2023-06-05 PROCEDURE — 3079F DIAST BP 80-89 MM HG: CPT | Mod: CPTII,,, | Performed by: NURSE PRACTITIONER

## 2023-06-05 PROCEDURE — 1159F MED LIST DOCD IN RCRD: CPT | Mod: CPTII,,, | Performed by: NURSE PRACTITIONER

## 2023-06-05 PROCEDURE — 96372 THER/PROPH/DIAG INJ SC/IM: CPT | Mod: ,,, | Performed by: NURSE PRACTITIONER

## 2023-06-05 PROCEDURE — 3077F PR MOST RECENT SYSTOLIC BLOOD PRESSURE >= 140 MM HG: ICD-10-PCS | Mod: CPTII,,, | Performed by: NURSE PRACTITIONER

## 2023-06-05 PROCEDURE — 1160F PR REVIEW ALL MEDS BY PRESCRIBER/CLIN PHARMACIST DOCUMENTED: ICD-10-PCS | Mod: CPTII,,, | Performed by: NURSE PRACTITIONER

## 2023-06-05 PROCEDURE — 3008F PR BODY MASS INDEX (BMI) DOCUMENTED: ICD-10-PCS | Mod: CPTII,,, | Performed by: NURSE PRACTITIONER

## 2023-06-05 PROCEDURE — 1160F RVW MEDS BY RX/DR IN RCRD: CPT | Mod: CPTII,,, | Performed by: NURSE PRACTITIONER

## 2023-06-05 PROCEDURE — 99213 OFFICE O/P EST LOW 20 MIN: CPT | Mod: 25,,, | Performed by: NURSE PRACTITIONER

## 2023-06-05 PROCEDURE — 1159F PR MEDICATION LIST DOCUMENTED IN MEDICAL RECORD: ICD-10-PCS | Mod: CPTII,,, | Performed by: NURSE PRACTITIONER

## 2023-06-05 PROCEDURE — 3008F BODY MASS INDEX DOCD: CPT | Mod: CPTII,,, | Performed by: NURSE PRACTITIONER

## 2023-06-05 PROCEDURE — 4010F ACE/ARB THERAPY RXD/TAKEN: CPT | Mod: CPTII,,, | Performed by: NURSE PRACTITIONER

## 2023-06-05 RX ORDER — CEFTRIAXONE 1 G/1
1 INJECTION, POWDER, FOR SOLUTION INTRAMUSCULAR; INTRAVENOUS
Status: COMPLETED | OUTPATIENT
Start: 2023-06-05 | End: 2023-06-05

## 2023-06-05 RX ADMIN — CEFTRIAXONE 1 G: 1 INJECTION, POWDER, FOR SOLUTION INTRAMUSCULAR; INTRAVENOUS at 02:06

## 2023-06-05 NOTE — PROGRESS NOTES
Clinic Note    Kathleen Payne is a 53 y.o. female     Chief Complaint:   Chief Complaint   Patient presents with    Dental Pain     Abcess in mouth/tooth. Left sided swelling noted. Has not been able to eat. Joyce gupta Dentist states that she would like to have antibiotic and steroid injection.        Subjective:    Patient complains of toothache that started 4 days ago. Patient states left lower jaw swollen, red, and painful. States swelling is now in neck. Reports redness and warmth is improved. Patient went to ED over weekend.Started on keflex.  Patient saw dentist today who sent her to clinic for antibiotic injection. Patient states she has not been able to sleep due to pain. Has tried multiple otc meds with no relief.  Patient has appointment with Dr. Alex in the morning.     Dental Pain   Pertinent negatives include no fever.      Allergies:   Review of patient's allergies indicates:  No Known Allergies     Past Medical History:  Past Medical History:   Diagnosis Date    Arthritis     Hypertension         Current Medications:    Current Outpatient Medications:     amLODIPine (NORVASC) 10 MG tablet, , Disp: , Rfl:     cephALEXin (KEFLEX) 500 MG capsule, Take 1 capsule (500 mg total) by mouth every 6 (six) hours. for 5 days, Disp: 20 capsule, Rfl: 0    chlorthalidone (HYGROTEN) 25 MG Tab, , Disp: , Rfl:     gabapentin (NEURONTIN) 800 MG tablet, , Disp: , Rfl:     metoprolol succinate (TOPROL-XL) 50 MG 24 hr tablet, , Disp: , Rfl:     olmesartan (BENICAR) 20 MG tablet, Take 20 mg by mouth once daily., Disp: , Rfl:     omeprazole (PRILOSEC) 40 MG capsule, , Disp: , Rfl:     venlafaxine (EFFEXOR-XR) 150 MG Cp24, , Disp: , Rfl:     ergocalciferol (ERGOCALCIFEROL) 50,000 unit Cap, Take 50,000 Units by mouth every 7 days., Disp: , Rfl:     hydrOXYzine pamoate (VISTARIL) 25 MG Cap, Take 25 mg by mouth 2 (two) times daily., Disp: , Rfl:     lithium carbonate 150 MG capsule, , Disp: , Rfl:   No current  "facility-administered medications for this visit.       Review of Systems   Constitutional:  Negative for fever.   HENT:  Positive for dental problem and facial swelling. Negative for sore throat and trouble swallowing.    Respiratory:  Negative for cough and shortness of breath.    Cardiovascular:  Negative for chest pain.   Gastrointestinal:  Negative for abdominal pain.        Objective:    BP (!) 173/81 (BP Location: Left arm, Patient Position: Sitting)   Pulse 68   Temp 98.5 °F (36.9 °C) (Oral)   Ht 5' 6" (1.676 m)   Wt 56.6 kg (124 lb 12.8 oz)   SpO2 99%   BMI 20.14 kg/m²      Physical Exam  Constitutional:       Appearance: She is ill-appearing.   HENT:      Head:      Jaw: Tenderness and swelling present.        Comments: Inflammation and erythema noted to left lower jaw as noted  Eyes:      Extraocular Movements: Extraocular movements intact.   Cardiovascular:      Rate and Rhythm: Normal rate and regular rhythm.      Pulses: Normal pulses.      Heart sounds: Normal heart sounds.   Pulmonary:      Effort: Pulmonary effort is normal.      Breath sounds: Normal breath sounds.   Lymphadenopathy:      Cervical: Cervical adenopathy present.   Neurological:      Mental Status: She is alert and oriented to person, place, and time.        Assessment and Plan:    1. Pain, dental    2. Cellulitis of face    3. Lymphadenopathy    4. Abscessed tooth         Pain, dental  -     cefTRIAXone injection 1 g    Cellulitis of face  -     cefTRIAXone injection 1 g    Lymphadenopathy  -     cefTRIAXone injection 1 g    Abscessed tooth    -f/u with Dr. Alex as scheduled  -see SEAN Cortes (dentist) note scanned to chart  -dentist rx cleocin, medrol dose pack, and norco 5mg at today's visit      There are no Patient Instructions on file for this visit.   Follow up if symptoms worsen or fail to improve.     "

## 2023-07-25 ENCOUNTER — PATIENT OUTREACH (OUTPATIENT)
Dept: ADMINISTRATIVE | Facility: HOSPITAL | Age: 53
End: 2023-07-25

## 2023-07-25 NOTE — PROGRESS NOTES
Gap report on blood pressure control. Last Bp was 173/81. No upcoming appt scheduled with PCP at this time. Called patient. No answer. L/m informing pt that it is time for a check up and to let us know if she has changed PCPs. Comment placed in chart that Bp needs to be less than 140/90.

## 2023-07-26 ENCOUNTER — PATIENT OUTREACH (OUTPATIENT)
Dept: ADMINISTRATIVE | Facility: HOSPITAL | Age: 53
End: 2023-07-26

## 2023-07-26 ENCOUNTER — PATIENT MESSAGE (OUTPATIENT)
Dept: ADMINISTRATIVE | Facility: HOSPITAL | Age: 53
End: 2023-07-26

## 2023-07-26 NOTE — PROGRESS NOTES
Documentation found in [employer] for mammogram in 2015 but unable to retrieve. No upcoming appt scheduled with PCP at this time. Message sent to pt on portal about scheduling this. Comment placed in chart that pt need this.

## 2023-08-01 DIAGNOSIS — R93.5 ABNORMAL ABDOMINAL ULTRASOUND: Primary | ICD-10-CM

## 2023-08-08 DIAGNOSIS — K62.5 HEMORRHAGE OF RECTUM AND ANUS: Primary | ICD-10-CM

## 2024-01-23 DIAGNOSIS — R93.5 ABN FINDINGS ON DX IMAGING OF ABD REGIONS, INC RETROPERITON: Primary | ICD-10-CM

## 2024-02-23 DIAGNOSIS — M79.672 LEFT FOOT PAIN: Primary | ICD-10-CM

## 2024-02-28 ENCOUNTER — TELEPHONE (OUTPATIENT)
Dept: ORTHOPEDICS | Facility: CLINIC | Age: 54
End: 2024-02-28
Payer: COMMERCIAL

## 2024-02-28 NOTE — TELEPHONE ENCOUNTER
Trying to get patient scheduled with Patience Gonzalez or Dr Murdock. Referral from Bessy Meek for left foot pain.

## 2024-07-16 ENCOUNTER — OFFICE VISIT (OUTPATIENT)
Dept: GASTROENTEROLOGY | Facility: CLINIC | Age: 54
End: 2024-07-16
Payer: COMMERCIAL

## 2024-07-16 VITALS
DIASTOLIC BLOOD PRESSURE: 63 MMHG | HEART RATE: 72 BPM | HEIGHT: 65 IN | BODY MASS INDEX: 21.66 KG/M2 | RESPIRATION RATE: 18 BRPM | SYSTOLIC BLOOD PRESSURE: 148 MMHG | WEIGHT: 130 LBS

## 2024-07-16 DIAGNOSIS — R93.5 ABN FINDINGS ON DX IMAGING OF ABD REGIONS, INC RETROPERITON: ICD-10-CM

## 2024-07-16 DIAGNOSIS — R63.4 WEIGHT LOSS: Primary | ICD-10-CM

## 2024-07-16 DIAGNOSIS — R13.10 DYSPHAGIA, UNSPECIFIED TYPE: ICD-10-CM

## 2024-07-16 PROCEDURE — 1160F RVW MEDS BY RX/DR IN RCRD: CPT | Mod: CPTII,,,

## 2024-07-16 PROCEDURE — 3008F BODY MASS INDEX DOCD: CPT | Mod: CPTII,,,

## 2024-07-16 PROCEDURE — 99999 PR PBB SHADOW E&M-EST. PATIENT-LVL IV: CPT | Mod: PBBFAC,,,

## 2024-07-16 PROCEDURE — 3078F DIAST BP <80 MM HG: CPT | Mod: CPTII,,,

## 2024-07-16 PROCEDURE — 1159F MED LIST DOCD IN RCRD: CPT | Mod: CPTII,,,

## 2024-07-16 PROCEDURE — 99215 OFFICE O/P EST HI 40 MIN: CPT | Mod: S$PBB,,,

## 2024-07-16 PROCEDURE — 99214 OFFICE O/P EST MOD 30 MIN: CPT | Mod: PBBFAC

## 2024-07-16 PROCEDURE — 3077F SYST BP >= 140 MM HG: CPT | Mod: CPTII,,,

## 2024-07-16 RX ORDER — HYDROCHLOROTHIAZIDE 12.5 MG/1
12.5 TABLET ORAL DAILY
COMMUNITY

## 2024-07-23 NOTE — PROGRESS NOTES
Gastroenterology Clinic Note    Patient ID: [id number]   Referring MD: Bessy Meek, GRAYSON   Chief Complaint:   Chief Complaint   Patient presents with    Weight Loss       History of Present Illness   Kathleen Payne is an 54 y.o. WF who is referred for weight loss.  Patient reports recurrent episodes of nausea, vomiting, and diarrhea over the past several months.  She has had an unintentional weight loss of 20 lb.  She has early satiety and abdominal bloating.  She denies hematochezia or melena.  She denies family history of CRC.  Denies use of NSAIDs.      Last colonoscopy was at [place].  Report unavailable for review.    Review of Systems   Constitutional:  Positive for weight loss.   Gastrointestinal:  Positive for abdominal pain, diarrhea, heartburn, nausea and vomiting. Negative for blood in stool, constipation and melena.       Past Medical History      Past Medical History:   Diagnosis Date    Arthritis     Hypertension        Past Surgical History     Past Surgical History:   Procedure Laterality Date     SECTION         Allergies   Review of patient's allergies indicates:  No Known Allergies    Immunization History     There is no immunization history on file for this patient.    Past Family History      Family History   Problem Relation Name Age of Onset    Hypertension Mother      Alzheimer's disease Father         Past Social History      Social History     Socioeconomic History    Marital status:    Tobacco Use    Smoking status: Every Day     Current packs/day: 1.50     Types: Cigarettes    Smokeless tobacco: Never   Substance and Sexual Activity    Alcohol use: Not Currently    Drug use: Yes     Types: Marijuana    Sexual activity: Not Currently     Partners: Male       Current Medications     Outpatient Medications Marked as Taking for the 24 encounter (Office Visit) with Cori Fernandez FNP   Medication Sig Dispense Refill    amLODIPine (NORVASC) 10 MG tablet        "gabapentin (NEURONTIN) 800 MG tablet       hydroCHLOROthiazide (HYDRODIURIL) 12.5 MG Tab Take 12.5 mg by mouth once daily.      metoprolol succinate (TOPROL-XL) 50 MG 24 hr tablet       omeprazole (PRILOSEC) 40 MG capsule       venlafaxine (EFFEXOR-XR) 150 MG Cp24 Take 75 mg by mouth once daily.          I have reviewed the current medications, allergies, vital signs, past medical and surgical history, family medical history, and social history for this encounter and agree with all findings.    OBJECTIVE    Physical Exam    BP (!) 148/63 (BP Location: Left arm, Patient Position: Sitting)   Pulse 72   Resp 18   Ht 5' 5" (1.651 m)   Wt 59 kg (130 lb)   BMI 21.63 kg/m²   GEN: Well appearing, cooperative, NAD  NECK: Supple, no LAD  CV: Normal rate  RESP: Unlabored  ABD: ND, no guarding  EXT: No clubbing, cyanosis, or edema  SKIN: Warm and dry  NEURO: AAO x4.     LABS    CBC (with or without Differential):   Lab Results   Component Value Date    WBC 9.55 05/04/2022    HGB 11.1 (L) 05/04/2022    HCT 37.3 (L) 05/04/2022    MCV 89.4 05/04/2022    MCH 26.6 (L) 05/04/2022    MCHC 29.8 (L) 05/04/2022    RDW 19.8 (H) 05/04/2022     (H) 05/04/2022    MPV 10.5 05/04/2022    NEUTOPHILPCT 60.4 05/04/2022    DIFFTYPE Auto 05/04/2022     BMP/CMP:   Lab Results   Component Value Date     05/04/2022    K 4.2 05/04/2022     05/04/2022    CO2 32 05/04/2022    BUN 16 05/04/2022    CREATININE 0.90 05/04/2022    GLU 71 (L) 05/04/2022    CALCIUM 9.2 05/04/2022    ALBUMIN 3.4 (L) 05/04/2022    AST 30 05/04/2022    ALT 46 05/04/2022    ALKPHOS 80 05/04/2022        IMAGING  No imaging available for review.    ASSESSMENT  Kathleen Payne is a 54 y.o. WF with history of hypertension and GERD who is referred for weight loss.    1. Weight loss    2. Abn findings on dx imaging of abd regions, inc retroperiton    3. Dysphagia, unspecified type           PLAN    - schedule EGD with possible dilation for further " evaluation of weight loss and dysphagia  - schedule colonoscopy for weight loss; rule out malignancy    There are no Patient Instructions on file for this visit.      No orders of the defined types were placed in this encounter.        The risks and benefits of my recommendations, as well as other treatment options were discussed with the patient today. All questions were answered.    50 minutes of total time spent on the encounter, which includes face to face time and non-face to face time preparing to see the patient (eg, review of tests), obtaining and/or reviewing separately obtained history, documenting clinical information in the electronic or other health record, Independently interpreting results (not separately reported) and communicating results to the patient/family/caregiver, or care coordination (not separately reported).        Cori Fernandez, JANEP/ACNP  [facility]

## 2024-08-21 DIAGNOSIS — R63.4 WEIGHT LOSS: Primary | ICD-10-CM

## 2024-08-21 RX ORDER — POLYETHYLENE GLYCOL 3350, SODIUM SULFATE ANHYDROUS, SODIUM BICARBONATE, SODIUM CHLORIDE, POTASSIUM CHLORIDE 236; 22.74; 6.74; 5.86; 2.97 G/4L; G/4L; G/4L; G/4L; G/4L
4 POWDER, FOR SOLUTION ORAL ONCE
Qty: 4000 ML | Refills: 0 | Status: SHIPPED | OUTPATIENT
Start: 2024-08-21 | End: 2024-08-21

## 2024-09-04 RX ORDER — POLYETHYLENE GLYCOL 3350, SODIUM SULFATE ANHYDROUS, SODIUM BICARBONATE, SODIUM CHLORIDE, POTASSIUM CHLORIDE 236; 22.74; 6.74; 5.86; 2.97 G/4L; G/4L; G/4L; G/4L; G/4L
4 POWDER, FOR SOLUTION ORAL ONCE
Qty: 4000 ML | Refills: 0 | Status: SHIPPED | OUTPATIENT
Start: 2024-09-04 | End: 2024-09-04

## 2024-09-06 ENCOUNTER — ANESTHESIA (OUTPATIENT)
Dept: GASTROENTEROLOGY | Facility: HOSPITAL | Age: 54
End: 2024-09-06
Payer: COMMERCIAL

## 2024-09-06 ENCOUNTER — HOSPITAL ENCOUNTER (OUTPATIENT)
Dept: GASTROENTEROLOGY | Facility: HOSPITAL | Age: 54
Discharge: HOME | End: 2024-09-06
Admitting: INTERNAL MEDICINE
Payer: COMMERCIAL

## 2024-09-06 ENCOUNTER — ANESTHESIA EVENT (OUTPATIENT)
Dept: GASTROENTEROLOGY | Facility: HOSPITAL | Age: 54
End: 2024-09-06
Payer: COMMERCIAL

## 2024-09-06 VITALS
WEIGHT: 130 LBS | RESPIRATION RATE: 11 BRPM | HEIGHT: 65 IN | BODY MASS INDEX: 21.66 KG/M2 | SYSTOLIC BLOOD PRESSURE: 127 MMHG | HEART RATE: 60 BPM | DIASTOLIC BLOOD PRESSURE: 65 MMHG | TEMPERATURE: 98 F | OXYGEN SATURATION: 98 %

## 2024-09-06 DIAGNOSIS — R63.4 WEIGHT LOSS: ICD-10-CM

## 2024-09-06 PROCEDURE — 63600175 PHARM REV CODE 636 W HCPCS: Performed by: NURSE ANESTHETIST, CERTIFIED REGISTERED

## 2024-09-06 PROCEDURE — 45380 COLONOSCOPY AND BIOPSY: CPT | Mod: 59,,, | Performed by: INTERNAL MEDICINE

## 2024-09-06 PROCEDURE — 37000009 HC ANESTHESIA EA ADD 15 MINS

## 2024-09-06 PROCEDURE — 45385 COLONOSCOPY W/LESION REMOVAL: CPT | Performed by: INTERNAL MEDICINE

## 2024-09-06 PROCEDURE — 45380 COLONOSCOPY AND BIOPSY: CPT | Mod: 59 | Performed by: INTERNAL MEDICINE

## 2024-09-06 PROCEDURE — 88305 TISSUE EXAM BY PATHOLOGIST: CPT | Mod: TC,91,SUR | Performed by: INTERNAL MEDICINE

## 2024-09-06 PROCEDURE — 25000003 PHARM REV CODE 250: Performed by: NURSE ANESTHETIST, CERTIFIED REGISTERED

## 2024-09-06 PROCEDURE — 27000284 HC CANNULA NASAL: Performed by: NURSE ANESTHETIST, CERTIFIED REGISTERED

## 2024-09-06 PROCEDURE — 45385 COLONOSCOPY W/LESION REMOVAL: CPT | Mod: ,,, | Performed by: INTERNAL MEDICINE

## 2024-09-06 PROCEDURE — 27201423 OPTIME MED/SURG SUP & DEVICES STERILE SUPPLY

## 2024-09-06 PROCEDURE — 37000008 HC ANESTHESIA 1ST 15 MINUTES

## 2024-09-06 PROCEDURE — 88305 TISSUE EXAM BY PATHOLOGIST: CPT | Mod: 26,,, | Performed by: PATHOLOGY

## 2024-09-06 PROCEDURE — D9220A PRA ANESTHESIA: Mod: ,,, | Performed by: NURSE ANESTHETIST, CERTIFIED REGISTERED

## 2024-09-06 RX ORDER — LIDOCAINE HYDROCHLORIDE 20 MG/ML
INJECTION, SOLUTION EPIDURAL; INFILTRATION; INTRACAUDAL; PERINEURAL
Status: DISCONTINUED | OUTPATIENT
Start: 2024-09-06 | End: 2024-09-06

## 2024-09-06 RX ORDER — SODIUM CHLORIDE 0.9 % (FLUSH) 0.9 %
10 SYRINGE (ML) INJECTION
Status: DISCONTINUED | OUTPATIENT
Start: 2024-09-06 | End: 2024-09-07 | Stop reason: HOSPADM

## 2024-09-06 RX ORDER — PROPOFOL 10 MG/ML
VIAL (ML) INTRAVENOUS CONTINUOUS PRN
Status: DISCONTINUED | OUTPATIENT
Start: 2024-09-06 | End: 2024-09-06

## 2024-09-06 RX ADMIN — PROPOFOL 150 MCG/KG/MIN: 10 INJECTION, EMULSION INTRAVENOUS at 02:09

## 2024-09-06 RX ADMIN — SODIUM CHLORIDE: 9 INJECTION, SOLUTION INTRAVENOUS at 02:09

## 2024-09-06 RX ADMIN — LIDOCAINE HYDROCHLORIDE 80 MG: 20 INJECTION, SOLUTION INTRAVENOUS at 02:09

## 2024-09-06 NOTE — H&P
Gastroenterology Pre-procedure H&P    History of Present Illness    Kathleen Payne is a 54 y.o. female that  has a past medical history of Arthritis and Hypertension.     Patient with 20+ lbs weight loss here for colonoscopy. Has had prior colonoscopy at [facility]SH with no report available.       Past Medical History:   Diagnosis Date    Arthritis     Hypertension        Past Surgical History:   Procedure Laterality Date     SECTION         Family History   Problem Relation Name Age of Onset    Hypertension Mother      Alzheimer's disease Father         Social History     Socioeconomic History    Marital status:    Tobacco Use    Smoking status: Every Day     Current packs/day: 1.50     Types: Cigarettes    Smokeless tobacco: Never   Substance and Sexual Activity    Alcohol use: Not Currently    Drug use: Yes     Types: Marijuana    Sexual activity: Not Currently     Partners: Male       Current Outpatient Medications   Medication Sig Dispense Refill    amLODIPine (NORVASC) 10 MG tablet       chlorthalidone (HYGROTEN) 25 MG Tab  (Patient not taking: Reported on 2024)      ergocalciferol (ERGOCALCIFEROL) 50,000 unit Cap Take 50,000 Units by mouth every 7 days. (Patient not taking: Reported on 2024)      gabapentin (NEURONTIN) 800 MG tablet       hydroCHLOROthiazide (HYDRODIURIL) 12.5 MG Tab Take 12.5 mg by mouth once daily.      hydrOXYzine pamoate (VISTARIL) 25 MG Cap Take 25 mg by mouth 2 (two) times daily. (Patient not taking: Reported on 2024)      lithium carbonate 150 MG capsule  (Patient not taking: Reported on 2024)      metoprolol succinate (TOPROL-XL) 50 MG 24 hr tablet       olmesartan (BENICAR) 20 MG tablet Take 20 mg by mouth once daily. (Patient not taking: Reported on 2024)      omeprazole (PRILOSEC) 40 MG capsule       venlafaxine (EFFEXOR-XR) 150 MG Cp24 Take 75 mg by mouth once daily.       No current facility-administered medications for this encounter.  "      Review of patient's allergies indicates:  No Known Allergies    Objective:  Vitals:    09/06/24 1324   BP: 125/69   Pulse: 65   Resp: 13   Temp: 98 °F (36.7 °C)   TempSrc: Oral   SpO2: 98%   Weight: 59 kg (130 lb)   Height: 5' 5" (1.651 m)        GEN: normal appearing, NAD, AAO x3  HENT: NCAT, anicteric, OP benign  CV: normal rate, regular rhythm  RESP: NABS, symmetric rise, unlabored  ABD: soft, ND, no guarding or TTP  SKIN: warm and dry  NEURO: grossly afocal    Assessment and Plan:    Proceed with:    Colonoscopy for 20+ lbs weigh tloss    Bradley Blanchard MD  Gastroenterology    "

## 2024-09-06 NOTE — TRANSFER OF CARE
"Anesthesia Transfer of Care Note    Patient: Kathleen Payne    Procedure(s) Performed: * Colonoscopy*    Patient location: GI    Anesthesia Type: MAC    Transport from OR: Transported from OR on room air with adequate spontaneous ventilation. Continuous ECG monitoring in transport. Continuous SpO2 monitoring in transport    Post pain: adequate analgesia    Post assessment: no apparent anesthetic complications    Post vital signs: stable    Level of consciousness: sedated and responds to stimulation    Nausea/Vomiting: no nausea/vomiting    Complications: none    Transfer of care protocol was followedComments: Good SV continue, NAD, VSS, RTRN      Last vitals: Visit Vitals  /60   Pulse 74   Temp 36.7 °C (98 °F)   Resp (!) 22   Ht 5' 5" (1.651 m)   Wt 59 kg (130 lb)   SpO2 95%   Breastfeeding No   BMI 21.63 kg/m²     "

## 2024-09-06 NOTE — ANESTHESIA PREPROCEDURE EVALUATION
2024  Kathleen Payne is a 54 y.o., female.    Review of patient's allergies indicates:  No Known Allergies  Scheduled Meds:  Continuous Infusions:  PRN Meds:.  Current Outpatient Medications on File Prior to Encounter   Medication Sig Dispense Refill    amLODIPine (NORVASC) 10 MG tablet       chlorthalidone (HYGROTEN) 25 MG Tab  (Patient not taking: Reported on 2024)      ergocalciferol (ERGOCALCIFEROL) 50,000 unit Cap Take 50,000 Units by mouth every 7 days. (Patient not taking: Reported on 2024)      gabapentin (NEURONTIN) 800 MG tablet       hydroCHLOROthiazide (HYDRODIURIL) 12.5 MG Tab Take 12.5 mg by mouth once daily.      hydrOXYzine pamoate (VISTARIL) 25 MG Cap Take 25 mg by mouth 2 (two) times daily. (Patient not taking: Reported on 2024)      lithium carbonate 150 MG capsule  (Patient not taking: Reported on 2024)      metoprolol succinate (TOPROL-XL) 50 MG 24 hr tablet       olmesartan (BENICAR) 20 MG tablet Take 20 mg by mouth once daily. (Patient not taking: Reported on 2024)      omeprazole (PRILOSEC) 40 MG capsule       venlafaxine (EFFEXOR-XR) 150 MG Cp24 Take 75 mg by mouth once daily.       No current facility-administered medications on file prior to encounter.      Past Surgical History:   Procedure Laterality Date     SECTION        Active Ambulatory Problems     Diagnosis Date Noted    Hypertension 2021    Osteoarthritis 2021    Fatigue 2021    Seborrheic keratoses 2021    Cervical spondylosis 2022     Resolved Ambulatory Problems     Diagnosis Date Noted    No Resolved Ambulatory Problems     Past Medical History:   Diagnosis Date    Arthritis       Pre-op Assessment    I have reviewed the Patient Summary Reports.     I have reviewed the Nursing Notes. I have reviewed the NPO Status.   I have reviewed the  Medications.     Review of Systems  Anesthesia Hx:   History of prior surgery of interest to airway management or planning:          Denies Family Hx of Anesthesia complications.    Denies Personal Hx of Anesthesia complications.                    Cardiovascular:     Hypertension                                  Hypertension         Musculoskeletal:  Arthritis        Arthritis          Neurological:      Arthritis                               Physical Exam  General: Well nourished    Airway:  Mallampati: II   Mouth Opening: Normal  TM Distance: Normal, at least 6 cm  Tongue: Normal  Neck ROM: Normal ROM        Anesthesia Plan  Type of Anesthesia, risks & benefits discussed:    Anesthesia Type: MAC  Intra-op Monitoring Plan: Standard ASA Monitors  Post Op Pain Control Plan: multimodal analgesia  Induction:  IV  Informed Consent: Informed consent signed with the Patient and all parties understand the risks and agree with anesthesia plan.  All questions answered. Patient consented to blood products? No  ASA Score: 3  Day of Surgery Review of History & Physical: H&P Update referred to the surgeon/provider.I have interviewed and examined the patient. I have reviewed the patient's H&P dated: There are no significant changes.     Ready For Surgery From Anesthesia Perspective.     .

## 2024-09-06 NOTE — DISCHARGE INSTRUCTIONS
Procedure Date  9/6/24     Impression  Overall Impression:   2 subcentimeter polyps were removed  The terminal ileum, ileocecal valve, cecum, ascending colon and sigmoid colon appeared normal.  (grade 2) hemorrhoids        Recommendation  Await pathology results  Chart reviewed, continue with plans for EGD  If EGD unremarkable, consider cross sectional imaging to evaluate weight loss if not already performed    Repeat colonoscopy in 7 years      Outcome of procedure: successful Colonoscopy  Disposition: patient to recovery following procedure; discharge to home when appropriate parameters met  Provisions for follow up: please call my office for any unexpected symptoms like chest or abdominal pain or bleeding following your procedure.  Final Diagnosis: colon polyp    NO DRIVING, OPERATING EQUIPMENT, OR SIGNING LEGAL DOCUMENTS FOR 24 HOURS.  THE NURSE WILL CALL YOU WITH YOUR BIOPSY RESULTS IN A FEW DAYS. IF YOU HAVE  [facility] MYCHART YOUR RESULTS WILL APPEAR THERE AS WELL.  Please call the GI Lab if you have any nausea, vomiting, or abdominal pain.

## 2024-09-09 LAB
ESTROGEN SERPL-MCNC: NORMAL PG/ML
INSULIN SERPL-ACNC: NORMAL U[IU]/ML
LAB AP GROSS DESCRIPTION: NORMAL
LAB AP LABORATORY NOTES: NORMAL
T3RU NFR SERPL: NORMAL %

## 2024-09-09 NOTE — ANESTHESIA POSTPROCEDURE EVALUATION
Anesthesia Post Evaluation    Patient: Kathleen Payne    Procedure(s) Performed: * Colonoscopy *    Final Anesthesia Type: general      Patient location during evaluation: GI PACU  Patient participation: Yes- Able to Participate  Level of consciousness: awake and alert  Post-procedure vital signs: reviewed and stable  Pain management: adequate  Airway patency: patent    PONV status at discharge: No PONV  Anesthetic complications: no      Cardiovascular status: blood pressure returned to baseline and hemodynamically stable  Respiratory status: spontaneous ventilation  Hydration status: euvolemic  Follow-up not needed.  Comments: Refer to nursing notes for pain/arturo score upon discharge from recovery.              Vitals Value Taken Time   /65 09/06/24 1520   Temp 36.7 °C (98 °F) 09/06/24 1459   Pulse 60 09/06/24 1529   Resp 12 09/06/24 1529   SpO2 99 % 09/06/24 1529   Vitals shown include unfiled device data.      Event Time   Out of Recovery 15:29:54         Pain/Arturo Score: No data recorded

## 2024-09-09 NOTE — PROGRESS NOTES
Ramon Iglesias, thank you for referring this patient to me. I recommend repeat colonoscopy in 7 years. Please let me know if you have any questions regarding this patient.

## 2024-09-19 ENCOUNTER — HOSPITAL ENCOUNTER (OUTPATIENT)
Dept: GASTROENTEROLOGY | Facility: HOSPITAL | Age: 54
Discharge: HOME | End: 2024-09-19
Admitting: INTERNAL MEDICINE
Payer: COMMERCIAL

## 2024-09-19 ENCOUNTER — ANESTHESIA EVENT (OUTPATIENT)
Dept: GASTROENTEROLOGY | Facility: HOSPITAL | Age: 54
End: 2024-09-19
Payer: COMMERCIAL

## 2024-09-19 ENCOUNTER — ANESTHESIA (OUTPATIENT)
Dept: GASTROENTEROLOGY | Facility: HOSPITAL | Age: 54
End: 2024-09-19
Payer: COMMERCIAL

## 2024-09-19 VITALS
OXYGEN SATURATION: 96 % | DIASTOLIC BLOOD PRESSURE: 63 MMHG | SYSTOLIC BLOOD PRESSURE: 126 MMHG | RESPIRATION RATE: 9 BRPM | BODY MASS INDEX: 21.16 KG/M2 | HEIGHT: 65 IN | WEIGHT: 127 LBS | TEMPERATURE: 97 F | HEART RATE: 54 BPM

## 2024-09-19 DIAGNOSIS — R63.4 WEIGHT LOSS: ICD-10-CM

## 2024-09-19 DIAGNOSIS — R13.10 DYSPHAGIA, UNSPECIFIED TYPE: ICD-10-CM

## 2024-09-19 DIAGNOSIS — K25.3 ACUTE GASTRIC ULCER WITHOUT HEMORRHAGE OR PERFORATION: Primary | ICD-10-CM

## 2024-09-19 PROCEDURE — 27000284 HC CANNULA NASAL

## 2024-09-19 PROCEDURE — 25000003 PHARM REV CODE 250

## 2024-09-19 PROCEDURE — 63600175 PHARM REV CODE 636 W HCPCS

## 2024-09-19 PROCEDURE — 37000009 HC ANESTHESIA EA ADD 15 MINS

## 2024-09-19 PROCEDURE — 27201423 OPTIME MED/SURG SUP & DEVICES STERILE SUPPLY

## 2024-09-19 PROCEDURE — 37000008 HC ANESTHESIA 1ST 15 MINUTES

## 2024-09-19 RX ORDER — LIDOCAINE HYDROCHLORIDE 20 MG/ML
INJECTION, SOLUTION EPIDURAL; INFILTRATION; INTRACAUDAL; PERINEURAL
Status: DISCONTINUED | OUTPATIENT
Start: 2024-09-19 | End: 2024-09-19

## 2024-09-19 RX ORDER — PROPOFOL 10 MG/ML
VIAL (ML) INTRAVENOUS
Status: DISCONTINUED | OUTPATIENT
Start: 2024-09-19 | End: 2024-09-19

## 2024-09-19 RX ORDER — SODIUM CHLORIDE 0.9 % (FLUSH) 0.9 %
10 SYRINGE (ML) INJECTION
Status: DISCONTINUED | OUTPATIENT
Start: 2024-09-19 | End: 2024-09-20 | Stop reason: HOSPADM

## 2024-09-19 RX ORDER — PANTOPRAZOLE SODIUM 40 MG/1
TABLET, DELAYED RELEASE ORAL
Qty: 180 TABLET | Refills: 1 | Status: SHIPPED | OUTPATIENT
Start: 2024-09-19 | End: 2025-12-18

## 2024-09-19 RX ADMIN — PROPOFOL 120 MG: 10 INJECTION, EMULSION INTRAVENOUS at 04:09

## 2024-09-19 RX ADMIN — LIDOCAINE HYDROCHLORIDE 80 MG: 20 INJECTION, SOLUTION INTRAVENOUS at 04:09

## 2024-09-19 RX ADMIN — PROPOFOL 50 MG: 10 INJECTION, EMULSION INTRAVENOUS at 04:09

## 2024-09-19 RX ADMIN — SODIUM CHLORIDE: 9 INJECTION, SOLUTION INTRAVENOUS at 03:09

## 2024-09-19 NOTE — ANESTHESIA POSTPROCEDURE EVALUATION
Anesthesia Post Evaluation    Patient: Kathleen Payne    Procedure(s) Performed: * EGD w/dilation  *    Final Anesthesia Type: MAC      Patient location during evaluation: GI PACU  Patient participation: Yes- Able to Participate  Level of consciousness: awake and alert  Post-procedure vital signs: reviewed and stable  Pain management: adequate  Airway patency: patent    PONV status at discharge: No PONV  Anesthetic complications: no      Cardiovascular status: blood pressure returned to baseline and hemodynamically stable  Respiratory status: unassisted, spontaneous ventilation and room air  Hydration status: euvolemic  Follow-up not needed.  Comments: Refer to nursing notes for pain/arturo score upon discharge from recovery.              Vitals Value Taken Time   /60 09/19/24 1636   Temp 36.1 °C (97 °F) 09/19/24 1621   Pulse 53 09/19/24 1638   Resp 12 09/19/24 1638   SpO2 93 % 09/19/24 1638   Vitals shown include unfiled device data.      No case tracking events are documented in the log.      Pain/Arturo Score: Arturo Score: 8 (9/19/2024  4:24 PM)

## 2024-09-19 NOTE — DISCHARGE INSTRUCTIONS
Procedure Date  9/19/24     Impression  Overall Impression:   Small type I hiatal hernia  Single ulcer in the antrum with clean base (Tin III); performed cold forceps biopsy  Moderate erythematous mucosa, consistent with gastritis in the antrum; performed cold forceps biopsies to rule out H. pylori  The upper third of the esophagus, middle third of the esophagus, lower third of the esophagus, Z-line, cardia, fundus of the stomach, body of the stomach, incisura, antrum, duodenal bulb, 1st part of the duodenum and 2nd part of the duodenum appeared normal. Performed random biopsy to rule out eosinophilic esophagitis.  Dilated in the esophagus with [employer] dil[employer] to 48 Fr ending size. Dilation caused bleeding, mucosal tears and improved passage of the scope; post-dilation bleeding was minimal; post-dilation mucosal tears were superficial        Recommendation  Await pathology results  Start Protonix twice daily for 2 months then daily  Avoid NSAIDs (ibuprofen, advil, aleve, etc)  Schedule repeat EGD in 2 months to evaluate for healing   Follow up in GI clinic as scheduled or sooner PRN      Outcome of procedure: successful EGD  Disposition: patient to recovery following procedure; discharge to home when appropriate parameters met  Provisions for follow up: please call my office for any unexpected symptoms like chest or abdominal pain or bleeding following your procedure.  Final Diagnosis: gastric ulcer      THE NURSE WILL CALL YOU WITH YOUR BIOPSY RESULTS IN A FEW DAYS. IF YOU HAVE  [facility] MYCHART YOUR RESULTS WILL APPEAR THERE AS WELL.  NO DRIVING, OPERATING EQUIPMENT, OR SIGNING LEGAL DOCUMENTS FOR 24 HOURS.

## 2024-09-19 NOTE — ANESTHESIA PREPROCEDURE EVALUATION
2024  Kathleen Payne is a 54 y.o., female.  Current Outpatient Medications on File Prior to Encounter   Medication Sig Dispense Refill    amLODIPine (NORVASC) 10 MG tablet       chlorthalidone (HYGROTEN) 25 MG Tab  (Patient not taking: Reported on 2024)      ergocalciferol (ERGOCALCIFEROL) 50,000 unit Cap Take 50,000 Units by mouth every 7 days. (Patient not taking: Reported on 2024)      gabapentin (NEURONTIN) 800 MG tablet       hydroCHLOROthiazide (HYDRODIURIL) 12.5 MG Tab Take 12.5 mg by mouth once daily.      hydrOXYzine pamoate (VISTARIL) 25 MG Cap Take 25 mg by mouth 2 (two) times daily. (Patient not taking: Reported on 2024)      lithium carbonate 150 MG capsule  (Patient not taking: Reported on 2024)      metoprolol succinate (TOPROL-XL) 50 MG 24 hr tablet       olmesartan (BENICAR) 20 MG tablet Take 20 mg by mouth once daily. (Patient not taking: Reported on 2024)      omeprazole (PRILOSEC) 40 MG capsule       venlafaxine (EFFEXOR-XR) 150 MG Cp24 Take 75 mg by mouth once daily.       No current facility-administered medications on file prior to encounter.     Active Ambulatory Problems     Diagnosis Date Noted    Hypertension 2021    Osteoarthritis 2021    Fatigue 2021    Seborrheic keratoses 2021    Cervical spondylosis 2022    Weight loss 2024     Resolved Ambulatory Problems     Diagnosis Date Noted    No Resolved Ambulatory Problems     Past Medical History:   Diagnosis Date    Arthritis      Past Surgical History:   Procedure Laterality Date     SECTION           Pre-op Assessment    I have reviewed the Patient Summary Reports.     I have reviewed the Nursing Notes. I have reviewed the NPO Status.   I have reviewed the Medications.     Review of Systems  Anesthesia Hx:  No problems with previous Anesthesia              Denies Family Hx of Anesthesia complications.    Denies Personal Hx of Anesthesia complications.                    Social:  Smoker, No Alcohol Use       Hematology/Oncology:  Hematology Normal   Oncology Normal                                   EENT/Dental:  EENT/Dental Normal           Cardiovascular:  Exercise tolerance: good   Hypertension                                        Pulmonary:  Pulmonary Normal                       Renal/:  Renal/ Normal                 Hepatic/GI:     GERD             Musculoskeletal:  Arthritis               Neurological:  Neurology Normal                                      Endocrine:  Endocrine Normal            Dermatological:  Skin Normal    Psych:  Psychiatric Normal                    Physical Exam  General: Well nourished, Cooperative, Alert and Oriented    Airway:  Mallampati: II   Mouth Opening: Normal  TM Distance: Normal  Tongue: Normal  Neck ROM: Normal ROM    Dental:  Partial Dentures    Chest/Lungs:  Normal Respiratory Rate        Anesthesia Plan  Type of Anesthesia, risks & benefits discussed:    Anesthesia Type: MAC  Intra-op Monitoring Plan: Standard ASA Monitors  Post Op Pain Control Plan: multimodal analgesia  Induction:  IV  Informed Consent: Informed consent signed with the Patient and all parties understand the risks and agree with anesthesia plan.  All questions answered. Patient consented to blood products? Yes  ASA Score: 2  Day of Surgery Review of History & Physical: H&P Update referred to the surgeon/provider.I have interviewed and examined the patient. I have reviewed the patient's H&P dated: There are no significant changes.     Ready For Surgery From Anesthesia Perspective.     .

## 2024-09-19 NOTE — TRANSFER OF CARE
"Anesthesia Transfer of Care Note    Patient: Kathleen Payne    Procedure(s) Performed: * EGD w/dilation *    Patient location: GI    Anesthesia Type: MAC    Transport from OR: Transported from OR on room air with adequate spontaneous ventilation. Continuous ECG monitoring in transport. Continuous SpO2 monitoring in transport    Post pain: adequate analgesia    Post assessment: no apparent anesthetic complications    Post vital signs: stable    Level of consciousness: sedated and responds to stimulation    Nausea/Vomiting: no nausea/vomiting    Complications: none    Transfer of care protocol was followedComments: Good SV continue, NAD, VSS, RTRN      Last vitals: Visit Vitals  BP (!) 140/77 (BP Location: Left arm, Patient Position: Lying)   Pulse 65   Temp 36.1 °C (97 °F) (Skin)   Resp 16   Ht 5' 5" (1.651 m)   Wt 57.6 kg (127 lb)   SpO2 (!) 94%   Breastfeeding No   BMI 21.13 kg/m²     "

## 2024-11-20 ENCOUNTER — PATIENT MESSAGE (OUTPATIENT)
Dept: ADMINISTRATIVE | Facility: HOSPITAL | Age: 54
End: 2024-11-20

## 2024-11-21 ENCOUNTER — ANESTHESIA EVENT (OUTPATIENT)
Dept: GASTROENTEROLOGY | Facility: HOSPITAL | Age: 54
End: 2024-11-21
Payer: COMMERCIAL

## 2024-11-21 ENCOUNTER — ANESTHESIA (OUTPATIENT)
Dept: GASTROENTEROLOGY | Facility: HOSPITAL | Age: 54
End: 2024-11-21
Payer: COMMERCIAL

## 2024-11-21 ENCOUNTER — HOSPITAL ENCOUNTER (OUTPATIENT)
Dept: GASTROENTEROLOGY | Facility: HOSPITAL | Age: 54
Discharge: HOME | End: 2024-11-21
Attending: INTERNAL MEDICINE | Admitting: INTERNAL MEDICINE
Payer: COMMERCIAL

## 2024-11-21 VITALS
OXYGEN SATURATION: 93 % | SYSTOLIC BLOOD PRESSURE: 107 MMHG | HEART RATE: 60 BPM | RESPIRATION RATE: 22 BRPM | HEIGHT: 65 IN | BODY MASS INDEX: 20.83 KG/M2 | WEIGHT: 125 LBS | DIASTOLIC BLOOD PRESSURE: 59 MMHG | TEMPERATURE: 98 F

## 2024-11-21 DIAGNOSIS — K25.3 ACUTE GASTRIC ULCER WITHOUT HEMORRHAGE OR PERFORATION: ICD-10-CM

## 2024-11-21 PROCEDURE — 27000284 HC CANNULA NASAL: Performed by: NURSE ANESTHETIST, CERTIFIED REGISTERED

## 2024-11-21 PROCEDURE — 63600175 PHARM REV CODE 636 W HCPCS: Performed by: NURSE ANESTHETIST, CERTIFIED REGISTERED

## 2024-11-21 PROCEDURE — 27201423 OPTIME MED/SURG SUP & DEVICES STERILE SUPPLY

## 2024-11-21 PROCEDURE — 27000716 HC OXISENSOR PROBE, ANY SIZE: Performed by: NURSE ANESTHETIST, CERTIFIED REGISTERED

## 2024-11-21 PROCEDURE — 37000008 HC ANESTHESIA 1ST 15 MINUTES

## 2024-11-21 RX ORDER — SODIUM CHLORIDE 0.9 % (FLUSH) 0.9 %
10 SYRINGE (ML) INJECTION
Status: DISCONTINUED | OUTPATIENT
Start: 2024-11-21 | End: 2024-11-22 | Stop reason: HOSPADM

## 2024-11-21 RX ORDER — PROPOFOL 10 MG/ML
VIAL (ML) INTRAVENOUS
Status: DISCONTINUED | OUTPATIENT
Start: 2024-11-21 | End: 2024-11-21

## 2024-11-21 RX ORDER — LIDOCAINE HYDROCHLORIDE 20 MG/ML
INJECTION, SOLUTION EPIDURAL; INFILTRATION; INTRACAUDAL; PERINEURAL
Status: DISCONTINUED | OUTPATIENT
Start: 2024-11-21 | End: 2024-11-21

## 2024-11-21 RX ORDER — PANTOPRAZOLE SODIUM 40 MG/1
40 TABLET, DELAYED RELEASE ORAL 2 TIMES DAILY
Qty: 180 TABLET | Refills: 1 | Status: SHIPPED | OUTPATIENT
Start: 2024-11-21 | End: 2025-03-21

## 2024-11-21 RX ADMIN — LIDOCAINE HYDROCHLORIDE 100 MG: 20 INJECTION, SOLUTION INTRAVENOUS at 03:11

## 2024-11-21 RX ADMIN — PROPOFOL 100 MG: 10 INJECTION, EMULSION INTRAVENOUS at 03:11

## 2024-11-21 NOTE — TRANSFER OF CARE
"Anesthesia Transfer of Care Note    Patient: Kathleen Payne    Procedure(s) Performed: * No procedures listed *    Patient location: GI    Anesthesia Type: MAC    Transport from OR: Transported from OR on room air with adequate spontaneous ventilation. Continuous ECG monitoring in transport. Continuous SpO2 monitoring in transport    Post pain: adequate analgesia    Post assessment: no apparent anesthetic complications    Post vital signs: stable    Level of consciousness: sedated and responds to stimulation    Nausea/Vomiting: no nausea/vomiting    Complications: none    Transfer of care protocol was followedComments: Good SV continue, NAD, VSS, RTRN      Last vitals: Visit Vitals  BP (!) 116/59 (BP Location: Left arm, Patient Position: Lying)   Pulse 63   Temp 36.6 °C (97.8 °F) (Oral)   Resp 18   Ht 5' 5" (1.651 m)   Wt 56.7 kg (125 lb)   SpO2 95%   Breastfeeding No   BMI 20.80 kg/m²     "

## 2024-11-21 NOTE — ANESTHESIA PREPROCEDURE EVALUATION
11/21/2024  Katlheen Payne is a 54 y.o., female.      Pre-op Assessment    I have reviewed the Patient Summary Reports.     I have reviewed the Nursing Notes. I have reviewed the NPO Status.   I have reviewed the Medications.     Review of Systems  Anesthesia Hx:  No problems with previous Anesthesia                Social:  Smoker, Recreational Drugs       Cardiovascular:     Hypertension                                          Musculoskeletal:  Arthritis                   Physical Exam  General: Well nourished, Cooperative, Alert and Oriented    Airway:  Mallampati: II   Mouth Opening: Normal  TM Distance: Normal  Tongue: Normal  Neck ROM: Normal ROM    Dental:  Intact    Chest/Lungs:  Clear to auscultation, Normal Respiratory Rate    Heart:  Rate: Normal  Rhythm: Regular Rhythm  Sounds: Normal    Abdomen:  Normal, Soft, Nontender        Anesthesia Plan  Type of Anesthesia, risks & benefits discussed:    Anesthesia Type: Gen Natural Airway, MAC  Intra-op Monitoring Plan: Standard ASA Monitors  Post Op Pain Control Plan: multimodal analgesia and IV/PO Opioids PRN  Induction:  IV  Informed Consent: Informed consent signed with the Patient and all parties understand the risks and agree with anesthesia plan.  All questions answered.   ASA Score: 2  Day of Surgery Review of History & Physical: I have interviewed and examined the patient. I have reviewed the patient's H&P dated:     Ready For Surgery From Anesthesia Perspective.     .

## 2024-11-21 NOTE — DISCHARGE INSTRUCTIONS
Impression  Overall Impression:   Esophagitis in the middle third of the esophagus; performed cold forceps biopsy  Small type I hiatal hernia  Single ulcer in the antrum with clean base (Tin III); performed cold forceps biopsy  Moderate erythematous mucosa in the antrum, consistent with gastritis; performed cold forceps biopsies to rule out H. pylori  The upper third of the esophagus, lower third of the esophagus, Z-line, cardia, fundus of the stomach, duodenal bulb, 1st part of the duodenum and 2nd part of the duodenum appeared normal. Performed random biopsy to rule out eosinophilic esophagitis.        Recommendation  Await pathology results  Continue Protonix twice daily   Schedule repeat EGD in 4 months to evaluate for healing  Recommend strict avoidance of NSAIDs    Follow up with PCP and in clinic as scheduled or as needed       Outcome of procedure: successful EGD  Disposition: patient to recovery following procedure; discharge to home when appropriate parameters met  Provisions for follow up: please call my office for any unexpected symptoms like chest or abdominal pain or bleeding following your procedure.  Final Diagnosis: gastric ulcer

## 2024-11-21 NOTE — ANESTHESIA POSTPROCEDURE EVALUATION
Anesthesia Post Evaluation    Patient: Kathleen WILKERSON lEmer    Procedure(s) Performed: EGD    Final Anesthesia Type: MAC      Patient location during evaluation: GI PACU  Patient participation: Yes- Able to Participate  Level of consciousness: awake and alert  Post-procedure vital signs: reviewed and stable  Pain management: adequate  Airway patency: patent    PONV status at discharge: No PONV  Anesthetic complications: no      Cardiovascular status: blood pressure returned to baseline and hemodynamically stable  Respiratory status: spontaneous ventilation  Hydration status: euvolemic  Follow-up not needed.  Comments: Refer to nursing notes for pain/isidra score upon discharge from recovery.              Vitals Value Taken Time   /72 11/21/24 1624   Temp 36.6 °C (97.8 °F) 11/21/24 1558   Pulse 53 11/21/24 1626   Resp 14 11/21/24 1626   SpO2 95 % 11/21/24 1626   Vitals shown include unfiled device data.      Event Time   Out of Recovery 16:29:37         Pain/Isidra Score: Isidra Score: 8 (11/21/2024  4:01 PM)

## 2024-11-21 NOTE — H&P
Gastroenterology Pre-procedure H&P    History of Present Illness    Kathleen Payne is a 54 y.o. female that  has a past medical history of Arthritis and Hypertension.     Patient with gastric ulcer here for surveillance. Biopsies negative for CA/HP.      Past Medical History:   Diagnosis Date    Arthritis     Hypertension        Past Surgical History:   Procedure Laterality Date    BILATERAL TUBAL LIGATION       SECTION         Family History   Problem Relation Name Age of Onset    Hypertension Mother      Alzheimer's disease Father         Social History     Socioeconomic History    Marital status:    Tobacco Use    Smoking status: Every Day     Current packs/day: 1.50     Types: Cigarettes    Smokeless tobacco: Never   Substance and Sexual Activity    Alcohol use: Not Currently    Drug use: Yes     Types: Marijuana    Sexual activity: Not Currently     Partners: Male       Current Outpatient Medications   Medication Sig Dispense Refill    chlorthalidone (HYGROTEN) 25 MG Tab  (Patient not taking: Reported on 2024)      ergocalciferol (ERGOCALCIFEROL) 50,000 unit Cap Take 50,000 Units by mouth every 7 days. (Patient not taking: Reported on 2024)      gabapentin (NEURONTIN) 800 MG tablet       hydroCHLOROthiazide (HYDRODIURIL) 12.5 MG Tab Take 12.5 mg by mouth once daily.      hydrOXYzine pamoate (VISTARIL) 25 MG Cap Take 25 mg by mouth 2 (two) times daily. (Patient not taking: Reported on 2024)      lithium carbonate 150 MG capsule  (Patient not taking: Reported on 2024)      metoprolol succinate (TOPROL-XL) 50 MG 24 hr tablet       olmesartan (BENICAR) 20 MG tablet Take 20 mg by mouth once daily. (Patient not taking: Reported on 2024)      pantoprazole (PROTONIX) 40 MG tablet Take 1 tablet (40 mg total) by mouth 2 (two) times daily for 90 days, THEN 1 tablet (40 mg total) once daily. 180 tablet 1    venlafaxine (EFFEXOR-XR) 150 MG Cp24 Take 75 mg by mouth once daily.   "     No current facility-administered medications for this encounter.       Review of patient's allergies indicates:  No Known Allergies    Objective:  Vitals:    11/21/24 1343   BP: (!) 155/65   Pulse: 63   Resp: 15   Temp: 98 °F (36.7 °C)   TempSrc: Oral   SpO2: 96%   Weight: 56.7 kg (125 lb)   Height: 5' 5" (1.651 m)        GEN: normal appearing, NAD, AAO x3  HENT: NCAT, anicteric, OP benign  CV: normal rate, regular rhythm  RESP: CTA, symmetric rise, unlabored  ABD: soft, ND, no guarding or TTP  SKIN: warm and dry  NEURO: grossly afocal    Assessment and Plan:    Proceed with:    EGD for surveillance of gastric ulcer       Bradley Blanchard MD  Gastroenterology  "